# Patient Record
Sex: FEMALE | Race: WHITE | Employment: FULL TIME | URBAN - METROPOLITAN AREA
[De-identification: names, ages, dates, MRNs, and addresses within clinical notes are randomized per-mention and may not be internally consistent; named-entity substitution may affect disease eponyms.]

---

## 2017-09-01 ENCOUNTER — HOSPITAL ENCOUNTER (OUTPATIENT)
Dept: RADIOLOGY | Facility: MEDICAL CENTER | Age: 52
End: 2017-09-01
Attending: FAMILY MEDICINE
Payer: COMMERCIAL

## 2017-09-01 ENCOUNTER — OFFICE VISIT (OUTPATIENT)
Dept: URGENT CARE | Facility: PHYSICIAN GROUP | Age: 52
End: 2017-09-01
Payer: COMMERCIAL

## 2017-09-01 VITALS
OXYGEN SATURATION: 97 % | SYSTOLIC BLOOD PRESSURE: 114 MMHG | HEART RATE: 109 BPM | HEIGHT: 67 IN | TEMPERATURE: 98.1 F | DIASTOLIC BLOOD PRESSURE: 76 MMHG

## 2017-09-01 DIAGNOSIS — H61.21 IMPACTED CERUMEN OF RIGHT EAR: ICD-10-CM

## 2017-09-01 DIAGNOSIS — R07.9 CHEST PAIN, UNSPECIFIED TYPE: ICD-10-CM

## 2017-09-01 PROCEDURE — 99203 OFFICE O/P NEW LOW 30 MIN: CPT | Mod: 25 | Performed by: FAMILY MEDICINE

## 2017-09-01 PROCEDURE — 69210 REMOVE IMPACTED EAR WAX UNI: CPT | Performed by: FAMILY MEDICINE

## 2017-09-01 PROCEDURE — 71020 DX-CHEST-2 VIEWS: CPT

## 2017-09-01 NOTE — PROGRESS NOTES
"Subjective:      Chief Complaint   Patient presents with   • Chest Pain     Sudden onset midline chest pain 2am this morning. No SOB, hurts to take a deep breath.                       Chest Pain   This is a recurrent problem. Episode onset: 2am.  The onset quality is undetermined. The problem occurs intermittently. The problem has been unchanged. The pain is present in the substernal region. The pain is mild. The quality of the pain is described as \"achy\". The pain does not radiate. Associated symptoms include:  None.  Pertinent negatives include no claudication, cough, exertional chest pressure, fever, nausea, near-syncope, numbness, syncope or vomiting. The pain is aggravated by nothing. Patient has tried NSAIDs for the symptoms. The treatment provided no relief. There are no known risk factors. Past medical history comments: none.  Pertinent negatives for family medical history include: no CAD.     Also c/o rt earwax impaction x 1  Wk      Past medical history was unremarkable and not pertinent to current issue  Social hx - denies tobacco, alcohol, drug use    Family hx:  Mother had MI at age 40               Review of Systems   Constitutional: Negative for fever, chills and weight loss.   HENT - denies cough, congestion, sore throat  Eyes: denies vision changes, discharge  Respiratory: Negative for cough and wheezing.    Cardiovascular: Negative for palpitations or PND.   Gastrointestinal:  No abdominal pain,  nausea, vomiting, diarrhea.  Negative for  blood in stool.    - no discharge, dysuria, frequency.      Neurological: Negative for dizziness and headaches.   musculoskeletal - denies myalgias, calf pain  Psych - denies anxiety/depression/mood changes.  Skin: no itching or rash  All other systems reviewed and are negative.       Objective:     There were no vitals taken for this visit.      Physical Exam   Constitutional: pt is oriented to person, place, and time. Pt appears well-developed and " well-nourished.   HENT:   Head: Normocephalic and atraumatic.   Mouth/Throat: Oropharynx is clear and moist.   Eyes: Conjunctivae and EOM are normal. Pupils are equal, round, and reactive to light. No scleral icterus.   Rt ear - cerumen impaction  Neck: Normal range of motion. Neck supple. No JVD present. No thyromegaly present.   Cardiovascular: Normal rate, regular rhythm, normal heart sounds and intact distal pulses.  Exam reveals no gallop and no friction rub.    No murmur heard.  Pulmonary/Chest: Effort normal and breath sounds normal. No respiratory distress. Pt has no wheezes. Pt has no rales. Pt exhibits + tenderness over sternum.   Abdominal: Soft.   Musculoskeletal: Normal range of motion. Pt exhibits no edema.   Lymphadenopathy:     Pt has no cervical adenopathy.   Neurological: pt is alert and oriented to person, place, and time. No cranial nerve deficit.   Skin: Skin is warm and dry. No erythema.   Psychiatric: pt has a normal mood and affect. patient's behavior is normal.     EKG interpretation - normal sinus rhythm. No ST or QRS morphology changes. QT interval is normal. Axis is positive. No signs of ischemia.       Assessment/Plan:        1. Chest pain, unspecified type  EKG Reassuring  Likely musculoskeletal.   Rx naprosyn 250mg tid, prn    Follow up in one week if no improvement, sooner if symptoms worsen.       2.  Cerumen impaction  Rt ear canal was impacted with cerumen. Ear lavage was performed with normal saline, afterward impacted cerumen was removed with speculum. Subsequent to this, tympanic membrane was visualized.

## 2018-06-28 ENCOUNTER — OFFICE VISIT (OUTPATIENT)
Dept: MEDICAL GROUP | Facility: PHYSICIAN GROUP | Age: 53
End: 2018-06-28
Payer: COMMERCIAL

## 2018-06-28 VITALS
DIASTOLIC BLOOD PRESSURE: 66 MMHG | HEIGHT: 67 IN | BODY MASS INDEX: 34.84 KG/M2 | SYSTOLIC BLOOD PRESSURE: 118 MMHG | HEART RATE: 81 BPM | TEMPERATURE: 98.3 F | WEIGHT: 222 LBS | OXYGEN SATURATION: 96 %

## 2018-06-28 DIAGNOSIS — Z12.31 SCREENING MAMMOGRAM, ENCOUNTER FOR: ICD-10-CM

## 2018-06-28 DIAGNOSIS — Z00.00 WELL ADULT ON ROUTINE HEALTH CHECK: ICD-10-CM

## 2018-06-28 DIAGNOSIS — M15.9 PRIMARY OSTEOARTHRITIS INVOLVING MULTIPLE JOINTS: ICD-10-CM

## 2018-06-28 DIAGNOSIS — N95.1 PERIMENOPAUSAL: ICD-10-CM

## 2018-06-28 PROBLEM — M15.0 PRIMARY OSTEOARTHRITIS INVOLVING MULTIPLE JOINTS: Status: ACTIVE | Noted: 2018-06-28

## 2018-06-28 PROCEDURE — 99386 PREV VISIT NEW AGE 40-64: CPT | Mod: 25 | Performed by: FAMILY MEDICINE

## 2018-06-28 ASSESSMENT — PATIENT HEALTH QUESTIONNAIRE - PHQ9: CLINICAL INTERPRETATION OF PHQ2 SCORE: 0

## 2018-06-28 NOTE — PROGRESS NOTES
Елена Julian is a 53 y.o. female here for changes in monthly cycle; joint pain  HPI: This is a pleasant 53-year-old female here today to establish care and presents to the following concerns:  Perimenopausal  This problem is new to me. Patient is reporting over the last few months that she is started to notice significant changes in her monthly cycle. She states that some months she will only spot for a couple of days; she is actually skipped a couple months; this month she actually had spotting for 10 days. She currently denies any mood fluctuations or hot flashes.    Primary osteoarthritis involving multiple joints  This problem is new to me. Patient reports multiple joints which she has pain along with occasional swelling. She states that the pain comes and goes; it is achy in nature; can be worse at the end of the day. She denies any numbness or tingling; denies that this prevents her from doing any of her daily activities.    Current medicines (including changes today)  No current outpatient prescriptions on file.     No current facility-administered medications for this visit.      She  has no past medical history of Diabetes (HCC); Hyperlipidemia; Hypertension; or Thyroid disease.  She  has a past surgical history that includes lumpectomy.  Social History   Substance Use Topics   • Smoking status: Never Smoker   • Smokeless tobacco: Never Used   • Alcohol use Yes      Comment: once a month     Social History     Social History Narrative   • No narrative on file     Family History   Problem Relation Age of Onset   • Heart Disease Mother    • Lung Disease Mother    • No Known Problems Father    • No Known Problems Sister    • Other Brother      MS   • Lung Disease Maternal Grandmother      Family Status   Relation Status   • Mother    • Father     shot   • Sister Alive   • Brother Alive   • Maternal Grandmother          ROS  + Joint pain; no chest pain, no shortness of breath, no abdominal  "pain, no headache  All other systems reviewed and are negative     Objective:     Blood pressure 118/66, pulse 81, temperature 36.8 °C (98.3 °F), height 1.702 m (5' 7\"), weight 100.7 kg (222 lb), SpO2 96 %. Body mass index is 34.77 kg/m².  Physical Exam:    Constitutional: Alert, no distress.  Skin: Warm, dry, good turgor, no rashes in visible areas.  Eye: Equal, round and reactive, conjunctiva clear, lids normal.  ENMT: Lips without lesions, good dentition, oropharynx clear.  Neck: Trachea midline, no masses, no thyromegaly. No cervical or supraclavicular lymphadenopathy.  Respiratory: Unlabored respiratory effort, lungs clear to auscultation, no wheezes, no ronchi.  Cardiovascular: Normal S1, S2, no murmur, no edema.  Abdomen: Soft, non-tender, no masses, no hepatosplenomegaly -obese and difficult to assess.  Psych: Alert and oriented x3, normal affect and mood.  Neuro: Cranial nerves II through XII grossly intact      Assessment and Plan:   The following treatment plan was discussed     1. Well adult on routine health check  COMP METABOLIC PANEL    CBC WITH DIFFERENTIAL    LIPID PROFILE    TSH    VITAMIN D,25 HYDROXY    No significant finding on exam; chart reviewed and updated; age-appropriate screening labs ordered; review at next appointment   2. Perimenopausal      Stable. Monitor   3. Primary osteoarthritis involving multiple joints      Stable. Patient will use over-the-counter Aspercreme or capsaicin cream for pain management; monitor   4. Screening mammogram, encounter for  MA-SCREEN MAMMO W/CAD-BILAT    Screening mammogram ordered; patient denies any breast issues today.        Records requested.  Followup: Return in about 6 weeks (around 8/9/2018) for pap/lab review/mammogram, Long.            "

## 2018-06-28 NOTE — ASSESSMENT & PLAN NOTE
This problem is new to me. Patient is reporting over the last few months that she is started to notice significant changes in her monthly cycle. She states that some months she will only spot for a couple of days; she is actually skipped a couple months; this month she actually had spotting for 10 days. She currently denies any mood fluctuations or hot flashes.

## 2018-06-28 NOTE — ASSESSMENT & PLAN NOTE
This problem is new to me. Patient reports multiple joints which she has pain along with occasional swelling. She states that the pain comes and goes; it is achy in nature; can be worse at the end of the day. She denies any numbness or tingling; denies that this prevents her from doing any of her daily activities.

## 2018-07-24 ENCOUNTER — HOSPITAL ENCOUNTER (OUTPATIENT)
Dept: RADIOLOGY | Facility: MEDICAL CENTER | Age: 53
End: 2018-07-24
Attending: FAMILY MEDICINE
Payer: COMMERCIAL

## 2018-07-24 DIAGNOSIS — Z12.31 SCREENING MAMMOGRAM, ENCOUNTER FOR: ICD-10-CM

## 2018-07-24 PROCEDURE — 77067 SCR MAMMO BI INCL CAD: CPT

## 2018-07-25 ENCOUNTER — TELEPHONE (OUTPATIENT)
Dept: MEDICAL GROUP | Facility: PHYSICIAN GROUP | Age: 53
End: 2018-07-25

## 2018-07-25 DIAGNOSIS — R92.8 ABNORMAL MAMMOGRAM OF RIGHT BREAST: ICD-10-CM

## 2018-07-25 DIAGNOSIS — Z00.00 WELL ADULT ON ROUTINE HEALTH CHECK: ICD-10-CM

## 2018-07-25 NOTE — TELEPHONE ENCOUNTER
----- Message from Denise Michelle D.O. sent at 7/25/2018  8:58 AM PDT -----  Please advise pt that additional images of the right breast will need to be completed.  I have ordered the tests - please give pt phone number to schedule    Denise Michelle D.O.

## 2018-08-01 ENCOUNTER — HOSPITAL ENCOUNTER (OUTPATIENT)
Dept: RADIOLOGY | Facility: MEDICAL CENTER | Age: 53
End: 2018-08-01
Attending: FAMILY MEDICINE
Payer: COMMERCIAL

## 2018-08-01 DIAGNOSIS — R92.8 ABNORMAL MAMMOGRAM OF RIGHT BREAST: ICD-10-CM

## 2018-08-01 PROCEDURE — 76642 ULTRASOUND BREAST LIMITED: CPT | Mod: RT

## 2018-08-01 PROCEDURE — 77065 DX MAMMO INCL CAD UNI: CPT | Mod: RT

## 2018-08-03 ENCOUNTER — HOSPITAL ENCOUNTER (OUTPATIENT)
Facility: MEDICAL CENTER | Age: 53
End: 2018-08-03
Attending: FAMILY MEDICINE
Payer: COMMERCIAL

## 2018-08-03 ENCOUNTER — OFFICE VISIT (OUTPATIENT)
Dept: MEDICAL GROUP | Facility: PHYSICIAN GROUP | Age: 53
End: 2018-08-03
Payer: COMMERCIAL

## 2018-08-03 VITALS
SYSTOLIC BLOOD PRESSURE: 112 MMHG | HEART RATE: 74 BPM | DIASTOLIC BLOOD PRESSURE: 74 MMHG | BODY MASS INDEX: 34.84 KG/M2 | OXYGEN SATURATION: 96 % | HEIGHT: 67 IN | TEMPERATURE: 97.4 F | WEIGHT: 222 LBS

## 2018-08-03 DIAGNOSIS — R79.89 LOW SERUM VITAMIN D: ICD-10-CM

## 2018-08-03 DIAGNOSIS — Z01.419 WELL WOMAN EXAM WITH ROUTINE GYNECOLOGICAL EXAM: ICD-10-CM

## 2018-08-03 DIAGNOSIS — E78.2 MIXED HYPERLIPIDEMIA: ICD-10-CM

## 2018-08-03 PROCEDURE — 88175 CYTOPATH C/V AUTO FLUID REDO: CPT

## 2018-08-03 PROCEDURE — 87624 HPV HI-RISK TYP POOLED RSLT: CPT

## 2018-08-03 PROCEDURE — 99396 PREV VISIT EST AGE 40-64: CPT | Mod: 25 | Performed by: FAMILY MEDICINE

## 2018-08-04 DIAGNOSIS — Z01.419 WELL WOMAN EXAM WITH ROUTINE GYNECOLOGICAL EXAM: ICD-10-CM

## 2018-08-04 NOTE — ASSESSMENT & PLAN NOTE
This problem is new to me.  Recent labs have been reviewed in detail with the patient today which shows that her total cholesterol is elevated to 46 and her triglycerides are elevated at 434.  Risk of elevated cholesterol and triglyceride have been reviewed with the patient.  She states that she recently has started a new eating plan and plans to start getting some daily exercise.  Patient would like to seriously consider just lifestyle changes for 3 months prior to needing to start any medication.

## 2018-08-04 NOTE — PROGRESS NOTES
SUBJECTIVE: 53 y.o. female for annual routine gynecologic exam  Chief Complaint   Patient presents with   • Gynecologic Exam       Obstetric History     No data available      Last Pap: 2015  History   Sexual Activity   • Sexual activity: Yes     Sexual history: currently sexually active, single partner   H/O Abnormal Pap no  Previous HIV testing? no  She  reports that she has never smoked. She has never used smokeless tobacco.        Allergies: Aspirin; Ibuprofen; and Vicodin [hydrocodone-acetaminophen]     ROS:    Reports no menopause symptoms of hot flashes, night sweats, sleep disruption, mood changes.Denies vaginal dryness.   No significant bloating/fluid retention, pelvic pain, or dyspareunia. No vaginal discharge   No breast tenderness, mass, nipple discharge, changes in size or contour, or abnormal cyclic discomfort.  No urinary tract symptoms, no incontinence.   No abdominal pain, change in bowel habits, black or bloody stools.    No unusual headaches, no visual changes, menstrual migraines   No prolonged cough. No dyspnea or chest pain on exertion.  No depression, labile mood, anxiety, libido changes, insomnia.  No polydipsia, polyuria, temperature intolerance.  No new/concerning skin lesions, concerns. No chest pain, no short of breath, no abdominal pain    Exercise: no regular exercise program  Preventive Care:mammogram    Current medicines (including changes today)  No current outpatient prescriptions on file.     No current facility-administered medications for this visit.      She  has a past medical history of Mixed hyperlipidemia (8/3/2018). She also has no past medical history of Diabetes (HCC); Hypertension; or Thyroid disease.  She  has a past surgical history that includes lumpectomy.     Family History:   Family History   Problem Relation Age of Onset   • Heart Disease Mother    • Lung Disease Mother    • No Known Problems Father    • No Known Problems Sister    • Other Brother         MS   •  "Lung Disease Maternal Grandmother           OBJECTIVE:   /74   Pulse 74   Temp 36.3 °C (97.4 °F)   Ht 1.702 m (5' 7\")   Wt 100.7 kg (222 lb)   SpO2 96%   BMI 34.77 kg/m²   Body mass index is 34.77 kg/m².    HEAD AND NECK:  Ears normal.  Throat, oral cavity and tongue normal.  Neck supple. No adenopathy or masses in the neck or supraclavicular regions.  No carotid bruits. No thyromegaly.    CHEST:  Clear, good air entry, no wheezes or rales.   HEART:  Regular rate and rhythm.  S1 and S2 normal.   No edema or JVD. ABDOMEN:  Soft without tenderness, guarding, mass or organomegaly.  No CVA tenderness or inguinal adenopathy.   EXTREMITIES:  Extremities, reflexes and peripheral pulses are normal.   SKIN: color normal, vascularity normal, no edema, temperature normal   No rashes or suspicious skin lesions noted.     Breast Exam: Performed with instruction during examination. No axillary lymphadenopathy, no skin changes, no dominant masses. No nipple retraction    Pelvic Exam -  Normal external genitalia with no lesions. Normal vaginal mucosa with normal rugation and no discharge. Cervix with no visible lesions. No cervical motion tenderness. Uterus is normal sized with no masses. No adnexal tenderness or enlargement appreciated. Thin Prep Pap is obtained, vaginal swab is obtained and specimen(s) sent to lab  Rectal: deferred    <ASSESSMENT and PLAN>  1. Well woman exam with routine gynecological exam  THINPREP PAP WITH HPV    No acute findings on exam; Pap collected; monitor for results   2. Low serum vitamin D      Uncontrolled.  Recommend over-the-counter vitamin D3 1000 international units daily   3. Mixed hyperlipidemia  COMP METABOLIC PANEL    LIPID PROFILE    Uncontrolled; patient will be given a trial of 3 months for lifestyle changes; recheck labs and monitor for results       Discussed  breast self exam, mammography screening, diet and exercise   Follow-up in 2 years for next Gyn exam and 2 years for " next Pap.   Next office visit for recheck of chronic medical conditions is due in 6 months

## 2018-08-04 NOTE — ASSESSMENT & PLAN NOTE
This problem is new to me.  Recent labs have been reviewed in detail with the patient today which shows that her vitamin D level is suboptimal at 39.  She currently does not take any supplemental vitamin D.

## 2018-08-06 LAB
CYTOLOGY REG CYTOL: NORMAL
HPV HR 12 DNA CVX QL NAA+PROBE: NEGATIVE
HPV16 DNA SPEC QL NAA+PROBE: NEGATIVE
HPV18 DNA SPEC QL NAA+PROBE: NEGATIVE
SPECIMEN SOURCE: NORMAL

## 2018-08-07 ENCOUNTER — TELEPHONE (OUTPATIENT)
Dept: MEDICAL GROUP | Facility: PHYSICIAN GROUP | Age: 53
End: 2018-08-07

## 2018-08-07 NOTE — TELEPHONE ENCOUNTER
----- Message from Denise Michelle D.O. sent at 8/7/2018  1:28 PM PDT -----  Please advise pt that the pap smear was normal.  Recommend repeat testing in 2 years.    Denise Michelle D.O.

## 2019-01-03 ENCOUNTER — OFFICE VISIT (OUTPATIENT)
Dept: MEDICAL GROUP | Facility: PHYSICIAN GROUP | Age: 54
End: 2019-01-03
Payer: COMMERCIAL

## 2019-01-03 VITALS
SYSTOLIC BLOOD PRESSURE: 108 MMHG | TEMPERATURE: 97.3 F | HEIGHT: 67 IN | WEIGHT: 212 LBS | OXYGEN SATURATION: 96 % | BODY MASS INDEX: 33.27 KG/M2 | DIASTOLIC BLOOD PRESSURE: 74 MMHG | HEART RATE: 74 BPM

## 2019-01-03 DIAGNOSIS — Z12.12 SCREENING FOR COLORECTAL CANCER: ICD-10-CM

## 2019-01-03 DIAGNOSIS — E66.9 CLASS 1 OBESITY WITHOUT SERIOUS COMORBIDITY WITH BODY MASS INDEX (BMI) OF 33.0 TO 33.9 IN ADULT, UNSPECIFIED OBESITY TYPE: ICD-10-CM

## 2019-01-03 DIAGNOSIS — Z12.11 SCREENING FOR COLORECTAL CANCER: ICD-10-CM

## 2019-01-03 DIAGNOSIS — R79.89 LOW SERUM VITAMIN D: ICD-10-CM

## 2019-01-03 DIAGNOSIS — N95.1 PERIMENOPAUSAL: ICD-10-CM

## 2019-01-03 DIAGNOSIS — E78.2 MIXED HYPERLIPIDEMIA: ICD-10-CM

## 2019-01-03 DIAGNOSIS — M15.9 PRIMARY OSTEOARTHRITIS INVOLVING MULTIPLE JOINTS: ICD-10-CM

## 2019-01-03 PROBLEM — E66.811 CLASS 1 OBESITY WITHOUT SERIOUS COMORBIDITY IN ADULT: Status: ACTIVE | Noted: 2019-01-03

## 2019-01-03 PROCEDURE — 99214 OFFICE O/P EST MOD 30 MIN: CPT | Performed by: NURSE PRACTITIONER

## 2019-01-03 NOTE — ASSESSMENT & PLAN NOTE
This is a chronic problem for the patient that is ongoing.  Patient reports that her back and bilateral hands are mostly affected.  The patient reports that she is having less pain related to osteoarthritis in her back as she is increasing her exercise and losing weight.  She also practices daily meditation which she believes helps with pain.  She has in the past tried CBD oil which was not helpful.  She will use peppermint oil on occasion on her hands which helps decrease pain.  She does not take any over-the-counter medications for this problem.

## 2019-01-03 NOTE — ASSESSMENT & PLAN NOTE
"This is a chronic problem for this patient that is uncontrolled.  The patient's most recent lipid profile was completed in July 2018. The patient's total cholesterol was 246, HDL 32, triglycerides 434.  The patient had an appointment in August 2018 with her prior PCP to discuss these lab results and has made drastic lifestyle changes in order to prevent requiring medication for this issue.  Patient reports that she is cut out sugar and fried foods.  She has increased vegetable intake and is eating more chicken and fish.  The patient joined a gym in December 2018 and is planning on meeting with a  starting 1/7/2019.  The patient has lost 10 pounds since August 2018.  She has also joined an online weight loss program called \"[employer]\" which helps with meal planning, exercise, supplements.  "

## 2019-01-03 NOTE — ASSESSMENT & PLAN NOTE
This is a chronic problem for the patient that is controlled.  The patient's last vitamin D level was 39 on 7/23/2018.  The patient is not taking any vitamin D supplements.

## 2019-01-03 NOTE — ASSESSMENT & PLAN NOTE
"This is a chronic problem for the patient that is uncontrolled.  The patient reports that she is still having intermittent periods, her last period was from 11/13/2018-11/19/2018 during which she went through 2 boxes of super plus tampons.  She was due to have her next period on December 12, 2018 but has not started as of today.  She reports that she does have occasional cramping and did have one day of a \"pink sludge discharge\".  She denies any menopausal symptoms including mood fluctuations or flashes.  "

## 2019-01-04 NOTE — PROGRESS NOTES
"CC:   Chief Complaint   Patient presents with   • Establish Care       HISTORY OF THE PRESENT ILLNESS: Patient is a 53 y.o. female. This pleasant patient is here today to establish care and discuss multiple issues as listed below.    Health Maintenance: Completed, patient declined Tdap vaccination today.  Patient reports that she had a previous allergic reaction to influenza vaccine, allergy added and influenza vaccine excluded from health maintenance.  Patient is eligible for Shingrix, however clinic is out of stock.  Patient is also overdue for colonoscopy, patient would prefer to complete the colon cancer screening annual fit test.    Mixed hyperlipidemia  This is a chronic problem for this patient that is uncontrolled.  The patient's most recent lipid profile was completed in July 2018. The patient's total cholesterol was 246, HDL 32, triglycerides 434.  The patient had an appointment in August 2018 with her prior PCP to discuss these lab results and has made drastic lifestyle changes in order to prevent requiring medication for this issue.  Patient reports that she is cut out sugar and fried foods.  She has increased vegetable intake and is eating more chicken and fish.  The patient joined a gym in December 2018 and is planning on meeting with a  starting 1/7/2019.  The patient has lost 10 pounds since August 2018.  She has also joined an online weight loss program called \"[employer]\" which helps with meal planning, exercise, supplements.    Low serum vitamin D  This is a chronic problem for the patient that is controlled.  The patient's last vitamin D level was 39 on 7/23/2018.  The patient is not taking any vitamin D supplements.    Primary osteoarthritis involving multiple joints  This is a chronic problem for the patient that is ongoing.  Patient reports that her back and bilateral hands are mostly affected.  The patient reports that she is having less pain related to osteoarthritis in her " "back as she is increasing her exercise and losing weight.  She also practices daily meditation which she believes helps with pain.  She has in the past tried CBD oil which was not helpful.  She will use peppermint oil on occasion on her hands which helps decrease pain.  She does not take any over-the-counter medications for this problem.    Perimenopausal  This is a chronic problem for the patient that is uncontrolled.  The patient reports that she is still having intermittent periods, her last period was from 11/13/2018-11/19/2018 during which she went through 2 boxes of super plus tampons.  She was due to have her next period on December 12, 2018 but has not started as of today.  She reports that she does have occasional cramping and did have one day of a \"pink sludge discharge\".  She denies any menopausal symptoms including mood fluctuations or flashes.    Class 1 obesity without serious comorbidity in adult  This is a chronic problem for the patient that is improving.  The patient's current weight is 212 pounds with a BMI of 33.2.  On 8/3/2018 the patient's weight was 222 pounds with BMI of 34.77.  In October 2018 the patient joined a weight loss/health program called \"[employer]\".  This prescription provides meal planning recommendations, exercise recommendations, supplements to be used before exercise, and protein meal replacement shakes that she drinks in the morning.  The patient has also significantly cut down on her sugar and fried food intake since August 2018 due to her elevated triglycerides.  The patient joined a gym in December 2018 and is planning to start seeing a  on 1/7/2019.  The patient also reports that she is excited to try to attend the annual conference in [place] for this program.  She also reports that she has lost 5 inches in her thighs and 3-1/2 inches in her abdomen.    Allergies: Aspirin; Ibuprofen; Influenza vaccines; and Vicodin [hydrocodone-acetaminophen]    No " current Epic-ordered outpatient prescriptions on file.     No current [facility]-ordered facility-administered medications on file.        Past Medical History:   Diagnosis Date   • Head ache     post    • Mixed hyperlipidemia 8/3/2018       Past Surgical History:   Procedure Laterality Date   • PRIMARY C SECTION  1988   • LUMPECTOMY Bilateral        Social History   Substance Use Topics   • Smoking status: Never Smoker   • Smokeless tobacco: Never Used   • Alcohol use Yes      Comment: once a month       Social History     Social History Narrative   • No narrative on file       Family History   Problem Relation Age of Onset   • Heart Disease Mother    • Lung Disease Mother    • No Known Problems Father    • Other Sister         Gum disease   • Heart Disease Sister    • Other Brother         MS   • Heart Attack Brother    • Lung Disease Brother    • Cancer Brother         mouth cancer r/t chewing tobacco   • Alcohol/Drug Brother         Yovanny ETOH   • Lung Disease Maternal Grandmother    • Kidney cancer Maternal Grandmother    • Lung Cancer Maternal Grandmother    • No Known Problems Paternal Grandmother    • No Known Problems Sister        ROS:   Constitutional:  Negative for fever, chills, unexpected weight change, night sweats, body aches, sleep issues,  and fatigue/generalized weakness.   HEENT:  Negative for headaches, vision changes, hearing changes, ear pain, tinnitus, ear discharge, rhinorrhea, sinus congestion, sneezing, sore throat, and neck pain.    Respiratory:  Negative for cough, shortness of breath, sputum production, hemoptysis, chest congestion, dyspnea, wheezing, and crackles.    Cardiovascular:  Negative for chest pain, palpitations, RAE, paroxsymal nocturnal dyspnea, orthopnea, and bilateral lower extremity edema.   Gastrointestinal:  Negative for heartburn, nausea, vomiting, abdominal pain, hematochezia, melena, diarrhea, constipation, and greasy/foul-smelling stools.   Genitourinary:   "Negative for dysuria, nocturia, polyuria, hematuria, pyuria, urinary urgency, urinary frequency, and urinary incontinence.   Musculoskeletal:  Negative for myalgias, back pain, and joint pain.   Skin:  Negative for rash, sores, lumps, itching, cyanotic skin color change.   Neurological:  Negative for dizziness, tingling, tremors, focal sensory deficit, focal weakness and headaches.   Endo/Heme/Allergies:  Does not bruise/bleed easily. Denies cold/heat intolerance.   Psychiatric/Behavioral: Negative for depression, suicidal/homicidal ideation and memory loss.        Exam: Blood pressure 108/74, pulse 74, temperature 36.3 °C (97.3 °F), temperature source Temporal, height 1.702 m (5' 7\"), weight 96.2 kg (212 lb), SpO2 96 %. Body mass index is 33.2 kg/m².    General:  Normal appearing. No distress.  HEENT:  Normocephalic. Eyes conjunctiva clear lids without ptosis, pupils equal and reactive to light accommodation, ears normal shape and contour, canals with small amount of cerumen bilaterally, tympanic membranes are benign, nasal mucosa benign, oropharynx is without erythema, edema or exudates.   Neck:  Supple without JVD or bruit. Thyroid is not enlarged.  Pulmonary:  Clear to ausculation.  Normal effort. No rales, ronchi, or wheezing.  Cardiovascular:  Regular rate and rhythm without murmur. Carotid and radial pulses are intact and equal bilaterally.  Abdomen:  Soft, nontender, nondistended. Normal bowel sounds. Liver and spleen are not palpable.  Neurologic:  Grossly nonfocal.  Lymph:  No cervical, supraclavicular or axillary lymph nodes are palpable.  Skin:  Warm and dry.  No obvious lesions.  Musculoskeletal:  Normal gait. No extremity cyanosis, clubbing, or edema.  Psych:  Normal mood and affect. Alert and oriented x3. Judgment and insight is normal.    Assessment/Plan  1. Mixed hyperlipidemia  This is a chronic problem for the patient that is ongoing.  The patient's last lipid profile was completed in July 2018.  " The patient's triglycerides were elevated at 434.  The patient has drastically changed her diet as well as increasing her activity levels in an attempt at avoiding medication for this issue.  We will plan to check a CMP and a lipid profile and follow-up based on results.  - COMP METABOLIC PANEL; Future  - Lipid Profile; Future    2. Class 1 obesity without serious comorbidity with body mass index (BMI) of 33.0 to 33.9 in adult, unspecified obesity type  This is a chronic problem for the patient that is improving.  The patient has lost 10 pounds since August 2018.  She has joined a gym and is going to be starting seeing a .  She is joined a health program online that offer support for healthy diet, exercise, and weight loss.  Continue with current plan.    3. Low serum vitamin D  This is a chronic problem for the patient that is controlled.  The patient is not taking any over-the-counter supplements for this issue.  Patient states that she spends a lot of times outdoors.    4. Primary osteoarthritis involving multiple joints  This is a chronic problem for the patient that is ongoing.  Patient states that her back and bilateral hands are affected.  The patient reports that her pain symptoms are decreasing with her increase in exercise and weight loss, and meditation.  She will also use over-the-counter peppermint oil to help with pain relief.  Continue current plan.    5. Perimenopausal  This is a chronic problem for the patient that is uncontrolled.  Patient continues to have intermittent periods and spotting.  Patient denies any menopausal symptoms such as mood fluctuations or hot flashes.    6. Screening for colorectal cancer  Order and fit kit provided.  - OCCULT BLOOD FECES IMMUNOASSAY (FIT); Future    Educated in proper administration of medication(s) ordered today including safety, possible SE, risks, benefits, rationale and alternatives to therapy.   Supportive care, differential diagnoses, and  indications for immediate follow-up discussed with patient.    Pathogenesis of diagnosis discussed including typical length and natural progression.    Instructed to return to clinic or nearest emergency department for any change in condition, further concerns, or worsening of symptoms.  Patient states understanding of the plan of care and discharge instructions.    Return in about 2 months (around 3/3/2019) for Follow up Labs, High Cholesterol.    Please note that this dictation was created using voice recognition software. I have made every reasonable attempt to correct obvious errors, but I expect that there are errors of grammar and possibly content that I did not discover before finalizing the note.

## 2019-01-04 NOTE — ASSESSMENT & PLAN NOTE
"This is a chronic problem for the patient that is improving.  The patient's current weight is 212 pounds with a BMI of 33.2.  On 8/3/2018 the patient's weight was 222 pounds with BMI of 34.77.  In October 2018 the patient joined a weight loss/health program called \"[facility]\".  This prescription provides meal planning recommendations, exercise recommendations, supplements to be used before exercise, and protein meal replacement shakes that she drinks in the morning.  The patient has also significantly cut down on her sugar and fried food intake since August 2018 due to her elevated triglycerides.  The patient joined a gym in December 2018 and is planning to start seeing a  on 1/7/2019.  The patient also reports that she is excited to try to attend the annual conference in [place] for this program.  She also reports that she has lost 5 inches in her thighs and 3-1/2 inches in her abdomen.  "

## 2019-01-20 ENCOUNTER — HOSPITAL ENCOUNTER (OUTPATIENT)
Facility: MEDICAL CENTER | Age: 54
End: 2019-01-20
Attending: NURSE PRACTITIONER
Payer: COMMERCIAL

## 2019-01-20 PROCEDURE — 82274 ASSAY TEST FOR BLOOD FECAL: CPT

## 2019-01-23 DIAGNOSIS — Z12.12 SCREENING FOR COLORECTAL CANCER: ICD-10-CM

## 2019-01-23 DIAGNOSIS — Z12.11 SCREENING FOR COLORECTAL CANCER: ICD-10-CM

## 2019-01-26 LAB — HEMOCCULT STL QL IA: NEGATIVE

## 2019-11-06 ENCOUNTER — OFFICE VISIT (OUTPATIENT)
Dept: MEDICAL GROUP | Facility: PHYSICIAN GROUP | Age: 54
End: 2019-11-06
Payer: COMMERCIAL

## 2019-11-06 ENCOUNTER — HOSPITAL ENCOUNTER (OUTPATIENT)
Dept: RADIOLOGY | Facility: MEDICAL CENTER | Age: 54
End: 2019-11-06
Attending: NURSE PRACTITIONER
Payer: COMMERCIAL

## 2019-11-06 VITALS
HEART RATE: 67 BPM | WEIGHT: 222 LBS | BODY MASS INDEX: 34.84 KG/M2 | OXYGEN SATURATION: 96 % | TEMPERATURE: 96.9 F | DIASTOLIC BLOOD PRESSURE: 80 MMHG | HEIGHT: 67 IN | SYSTOLIC BLOOD PRESSURE: 118 MMHG

## 2019-11-06 DIAGNOSIS — R06.2 WHEEZING: ICD-10-CM

## 2019-11-06 DIAGNOSIS — R20.0 NUMBNESS OF FINGERS: ICD-10-CM

## 2019-11-06 DIAGNOSIS — E66.9 CLASS 1 OBESITY WITHOUT SERIOUS COMORBIDITY WITH BODY MASS INDEX (BMI) OF 34.0 TO 34.9 IN ADULT, UNSPECIFIED OBESITY TYPE: ICD-10-CM

## 2019-11-06 PROCEDURE — 71046 X-RAY EXAM CHEST 2 VIEWS: CPT

## 2019-11-06 PROCEDURE — 99214 OFFICE O/P EST MOD 30 MIN: CPT | Performed by: NURSE PRACTITIONER

## 2019-11-06 ASSESSMENT — PATIENT HEALTH QUESTIONNAIRE - PHQ9: CLINICAL INTERPRETATION OF PHQ2 SCORE: 0

## 2019-11-06 NOTE — ASSESSMENT & PLAN NOTE
"This is a new problem to the examiner.  New problem for the patient that has been going on for a couple of months.  Patient reports that she is experiencing numbness in her right hand, specifically thumb, pointer finger, middle finger.  Patient reports she also has a \"knot\" on her middle finger that has gotten larger.  Patient denies any neck injuries, does have a history of a back injury.  Patient reports that when she woke up this morning her right hand was stuck in a claw shape and when she forced her finger straight she had \"zinging pains up her wrist\".  The patient does have a history of surgery on her left wrist cyst removals.  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  Reports that the numbness in her fingers is always there.  "

## 2019-11-06 NOTE — PROGRESS NOTES
"CC:   Chief Complaint   Patient presents with   • Numbness     3 fingers on right hand, shoots pain into wrist x 2 months   • Wheezing     at night x 1 month     HISTORY OF THE PRESENT ILLNESS: Patient is a 54 y.o. female. This pleasant patient is here today to discuss right hand numbness and nighttime wheezing.    Health Maintenance: Completed    Wheezing  This is a new problem to the examiner.  New problem for the patient that began approximately 1 month ago.  Patient states that she has been waking up in the middle of the night wheezing.  States that she does not have any other symptoms including cough, congestion, shortness of breath.  Patient also reports that she does not have wheezing during the day.  States that breathing in cold air does not make her wheezing worse, denies having seasonal or environmental allergies, denies heartburn.  The patient does have a history of pneumonia in the past and states that current symptoms do not feel like when she was sick in the past.  Denies fevers, chills, headaches.  The patient is a never smoker.    Numbness of fingers  This is a new problem to the examiner.  New problem for the patient that has been going on for a couple of months.  Patient reports that she is experiencing numbness in her right hand, specifically thumb, pointer finger, middle finger.  Patient reports she also has a \"knot\" on her middle finger that has gotten larger.  Patient denies any neck injuries, does have a history of a back injury.  Patient reports that when she woke up this morning her right hand was stuck in a claw shape and when she forced her finger straight she had \"zinging pains up her wrist\".  The patient does have a history of surgery on her left wrist cyst removals.  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  Reports that the numbness in her fingers is always there.    Class 1 obesity without serious comorbidity in " "adult  Chronic problem for the patient that is ongoing.  Patient continues to participate in weight loss/health program called \"[employer]\".  The patient has recently decreased her frequency at the gym and has become less strict on her diet, which has resulted in a 10 pound weight gain since she was seen in 2019.  Vitals 8/3/2018 1/3/2019 2019   WEIGHT 222 212 222   HEIGHT 5' 7\" 5' 7\" 5' 7\"   BMI 34.77 kg/m2 33.2 kg/m2 34.77 kg/m2     Allergies: Aspirin; Ibuprofen; Influenza vaccines; and Vicodin [hydrocodone-acetaminophen]    No current Epic-ordered outpatient medications on file.     No current Epic-ordered facility-administered medications on file.      Past Medical History:   Diagnosis Date   • Head ache     post    • Mixed hyperlipidemia 8/3/2018     Past Surgical History:   Procedure Laterality Date   • PRIMARY C SECTION  1988   • LUMPECTOMY Bilateral      Social History     Tobacco Use   • Smoking status: Never Smoker   • Smokeless tobacco: Never Used   Substance Use Topics   • Alcohol use: Yes     Comment: once a month   • Drug use: No     Social History     Patient does not qualify to have social determinant information on file (likely too young).   Social History Narrative   • Not on file     Family History   Problem Relation Age of Onset   • Heart Disease Mother    • Lung Disease Mother    • No Known Problems Father    • Other Sister         Gum disease   • Heart Disease Sister    • Other Brother         MS   • Heart Attack Brother    • Lung Disease Brother    • Cancer Brother         mouth cancer r/t chewing tobacco   • Alcohol/Drug Brother         Yovanny ETOH   • Lung Disease Maternal Grandmother    • Kidney cancer Maternal Grandmother    • Lung Cancer Maternal Grandmother    • No Known Problems Paternal Grandmother    • No Known Problems Sister      ROS:   Constitutional: No Fevers, Chills  Eyes: No eye pain  ENT: No sore throat  Resp: + Nighttime wheezing.  No Shortness of " "breath  CV: No Chest pain  GI: No Nausea/Vomiting  MSK: No weakness  Skin: No rashes  Neuro: + Right hand thumb, pointer finger, middle finger numbness.  No Headaches  Psych: No Suicidal ideations    All remaining systems reviewed and found to be negative, except as stated above.      Exam: /80 (BP Location: Left arm, Patient Position: Sitting, BP Cuff Size: Adult)   Pulse 67   Temp 36.1 °C (96.9 °F) (Temporal)   Ht 1.702 m (5' 7\")   Wt 100.7 kg (222 lb)   SpO2 96%  Body mass index is 34.77 kg/m².    General:  Normal appearing. No distress.  HEENT:  Normocephalic. Eyes conjunctiva clear lids without ptosis, pupils equal and reactive to light accommodation, ears normal shape and contour, canals are clear bilaterally, tympanic membranes are benign, nasal mucosa benign, oropharynx is without erythema, edema or exudates.  Pulmonary:  Clear to ausculation.  Normal effort. No rales, ronchi, or wheezing.  Cardiovascular:  Regular rate and rhythm without murmur. Carotid and radial pulses are intact and equal bilaterally.  Neurologic:  Grossly nonfocal.  Lymph:  No cervical, supraclavicular lymph nodes are palpable.  Skin:  Warm and dry.  No obvious lesions.  Musculoskeletal:  Normal gait. No extremity cyanosis, clubbing, or edema.  Psych:  Normal mood and affect. Alert and oriented x3. Judgment and insight is normal.    Assessment/Plan  1. Wheezing  - DX-CHEST-2 VIEWS; Future    2. Numbness of fingers  - REFERRAL TO NEURODIAGNOSTICS (EEG,EP,EMG/NCS/DBS) Modality Requested: EMG-Comment Extremities (Right upper extremity)    3. Class 1 obesity without serious comorbidity with body mass index (BMI) of 34.0 to 34.9 in adult, unspecified obesity type  Continue working on healthy diet, exercise, weight loss.  Continue working with [employer] program.    Educated in proper administration of medication(s) ordered today including safety, possible SE, risks, benefits, rationale and alternatives to therapy.   Supportive " care, differential diagnoses, and indications for immediate follow-up discussed with patient.    Pathogenesis of diagnosis discussed including typical length and natural progression.    Instructed to return to clinic or nearest emergency department for any change in condition, further concerns, or worsening of symptoms.  Patient states understanding of the plan of care and discharge instructions.    Return if symptoms worsen or fail to improve, for Follow up Diagnostics.    Please note that this dictation was created using voice recognition software. I have made every reasonable attempt to correct obvious errors, but I expect that there are errors of grammar and possibly content that I did not discover before finalizing the note.

## 2019-11-06 NOTE — ASSESSMENT & PLAN NOTE
"Chronic problem for the patient that is ongoing.  Patient continues to participate in weight loss/health program called \"[facility]\".  The patient has recently decreased her frequency at the gym and has become less strict on her diet, which has resulted in a 10 pound weight gain since she was seen in January 2019.  Vitals 8/3/2018 1/3/2019 11/6/2019   WEIGHT 222 212 222   HEIGHT 5' 7\" 5' 7\" 5' 7\"   BMI 34.77 kg/m2 33.2 kg/m2 34.77 kg/m2     "

## 2019-11-06 NOTE — ASSESSMENT & PLAN NOTE
This is a new problem to the examiner.  New problem for the patient that began approximately 1 month ago.  Patient states that she has been waking up in the middle of the night wheezing.  States that she does not have any other symptoms including cough, congestion, shortness of breath.  Patient also reports that she does not have wheezing during the day.  States that breathing in cold air does not make her wheezing worse, denies having seasonal or environmental allergies, denies heartburn.  The patient does have a history of pneumonia in the past and states that current symptoms do not feel like when she was sick in the past.  Denies fevers, chills, headaches.  The patient is a never smoker.

## 2020-01-03 ENCOUNTER — NON-PROVIDER VISIT (OUTPATIENT)
Dept: NEUROLOGY | Facility: MEDICAL CENTER | Age: 55
End: 2020-01-03
Payer: COMMERCIAL

## 2020-01-03 DIAGNOSIS — R20.0 NUMBNESS OF FINGERS: ICD-10-CM

## 2020-01-03 PROCEDURE — 95886 MUSC TEST DONE W/N TEST COMP: CPT | Performed by: PSYCHIATRY & NEUROLOGY

## 2020-01-03 PROCEDURE — 95908 NRV CNDJ TST 3-4 STUDIES: CPT | Performed by: PSYCHIATRY & NEUROLOGY

## 2020-01-07 DIAGNOSIS — R20.0 NUMBNESS OF FINGERS: ICD-10-CM

## 2020-01-20 ENCOUNTER — HOSPITAL ENCOUNTER (OUTPATIENT)
Dept: RADIOLOGY | Facility: MEDICAL CENTER | Age: 55
End: 2020-01-20
Attending: NURSE PRACTITIONER
Payer: COMMERCIAL

## 2020-01-20 DIAGNOSIS — R20.0 NUMBNESS OF FINGERS: ICD-10-CM

## 2020-01-20 PROCEDURE — 76882 US LMTD JT/FCL EVL NVASC XTR: CPT | Mod: RT

## 2020-01-22 DIAGNOSIS — R20.0 NUMBNESS OF FINGERS: ICD-10-CM

## 2020-01-22 NOTE — PROGRESS NOTES
1. Numbness of fingers  - REFERRAL TO ORTHOPEDICS    EMG and ultrasound completed without specific cause for patients pain and numbness. Referral to ortho.

## 2020-02-06 SDOH — HEALTH STABILITY: MENTAL HEALTH: HOW OFTEN DO YOU HAVE 6 OR MORE DRINKS ON ONE OCCASION?: MONTHLY

## 2020-02-06 SDOH — HEALTH STABILITY: MENTAL HEALTH: HOW MANY STANDARD DRINKS CONTAINING ALCOHOL DO YOU HAVE ON A TYPICAL DAY?: 1 OR 2

## 2020-02-10 ENCOUNTER — ANESTHESIA EVENT (OUTPATIENT)
Dept: SURGERY | Facility: MEDICAL CENTER | Age: 55
End: 2020-02-10
Payer: COMMERCIAL

## 2020-02-10 ENCOUNTER — HOSPITAL ENCOUNTER (OUTPATIENT)
Facility: MEDICAL CENTER | Age: 55
End: 2020-02-10
Attending: ORTHOPAEDIC SURGERY | Admitting: ORTHOPAEDIC SURGERY
Payer: COMMERCIAL

## 2020-02-10 ENCOUNTER — ANESTHESIA (OUTPATIENT)
Dept: SURGERY | Facility: MEDICAL CENTER | Age: 55
End: 2020-02-10
Payer: COMMERCIAL

## 2020-02-10 VITALS
HEIGHT: 67 IN | BODY MASS INDEX: 34.46 KG/M2 | WEIGHT: 219.58 LBS | HEART RATE: 71 BPM | RESPIRATION RATE: 16 BRPM | DIASTOLIC BLOOD PRESSURE: 63 MMHG | OXYGEN SATURATION: 96 % | TEMPERATURE: 97.7 F | SYSTOLIC BLOOD PRESSURE: 116 MMHG

## 2020-02-10 PROCEDURE — 700111 HCHG RX REV CODE 636 W/ 250 OVERRIDE (IP): Performed by: ORTHOPAEDIC SURGERY

## 2020-02-10 PROCEDURE — A9270 NON-COVERED ITEM OR SERVICE: HCPCS | Performed by: ANESTHESIOLOGY

## 2020-02-10 PROCEDURE — 501838 HCHG SUTURE GENERAL: Performed by: ORTHOPAEDIC SURGERY

## 2020-02-10 PROCEDURE — 160025 RECOVERY II MINUTES (STATS): Performed by: ORTHOPAEDIC SURGERY

## 2020-02-10 PROCEDURE — 700111 HCHG RX REV CODE 636 W/ 250 OVERRIDE (IP): Performed by: ANESTHESIOLOGY

## 2020-02-10 PROCEDURE — 500881 HCHG PACK, EXTREMITY: Performed by: ORTHOPAEDIC SURGERY

## 2020-02-10 PROCEDURE — 160028 HCHG SURGERY MINUTES - 1ST 30 MINS LEVEL 3: Performed by: ORTHOPAEDIC SURGERY

## 2020-02-10 PROCEDURE — 700105 HCHG RX REV CODE 258: Performed by: ORTHOPAEDIC SURGERY

## 2020-02-10 PROCEDURE — 160046 HCHG PACU - 1ST 60 MINS PHASE II: Performed by: ORTHOPAEDIC SURGERY

## 2020-02-10 PROCEDURE — 700102 HCHG RX REV CODE 250 W/ 637 OVERRIDE(OP): Performed by: ANESTHESIOLOGY

## 2020-02-10 PROCEDURE — 700101 HCHG RX REV CODE 250: Performed by: ORTHOPAEDIC SURGERY

## 2020-02-10 PROCEDURE — 160048 HCHG OR STATISTICAL LEVEL 1-5: Performed by: ORTHOPAEDIC SURGERY

## 2020-02-10 PROCEDURE — 160009 HCHG ANES TIME/MIN: Performed by: ORTHOPAEDIC SURGERY

## 2020-02-10 PROCEDURE — A6222 GAUZE <=16 IN NO W/SAL W/O B: HCPCS | Performed by: ORTHOPAEDIC SURGERY

## 2020-02-10 PROCEDURE — 500423 HCHG DRESSING, ABD COMBINE: Performed by: ORTHOPAEDIC SURGERY

## 2020-02-10 PROCEDURE — 160047 HCHG PACU  - EA ADDL 30 MINS PHASE II: Performed by: ORTHOPAEDIC SURGERY

## 2020-02-10 PROCEDURE — 302135 SEQUENTIAL COMPRESSION MACHINE: Performed by: ORTHOPAEDIC SURGERY

## 2020-02-10 RX ORDER — LIDOCAINE HYDROCHLORIDE 10 MG/ML
INJECTION, SOLUTION INFILTRATION; PERINEURAL
Status: DISCONTINUED | OUTPATIENT
Start: 2020-02-10 | End: 2020-02-10 | Stop reason: HOSPADM

## 2020-02-10 RX ORDER — MIDAZOLAM HYDROCHLORIDE 1 MG/ML
INJECTION INTRAMUSCULAR; INTRAVENOUS PRN
Status: DISCONTINUED | OUTPATIENT
Start: 2020-02-10 | End: 2020-02-10 | Stop reason: SURG

## 2020-02-10 RX ORDER — ACETAMINOPHEN 500 MG
1000 TABLET ORAL ONCE
Status: COMPLETED | OUTPATIENT
Start: 2020-02-10 | End: 2020-02-10

## 2020-02-10 RX ORDER — BUPIVACAINE HYDROCHLORIDE 2.5 MG/ML
INJECTION, SOLUTION EPIDURAL; INFILTRATION; INTRACAUDAL
Status: DISCONTINUED | OUTPATIENT
Start: 2020-02-10 | End: 2020-02-10 | Stop reason: HOSPADM

## 2020-02-10 RX ORDER — LORAZEPAM 2 MG/ML
0.5 INJECTION INTRAMUSCULAR
Status: DISCONTINUED | OUTPATIENT
Start: 2020-02-10 | End: 2020-02-10 | Stop reason: HOSPADM

## 2020-02-10 RX ORDER — HYDROMORPHONE HYDROCHLORIDE 1 MG/ML
0.2 INJECTION, SOLUTION INTRAMUSCULAR; INTRAVENOUS; SUBCUTANEOUS
Status: DISCONTINUED | OUTPATIENT
Start: 2020-02-10 | End: 2020-02-10 | Stop reason: HOSPADM

## 2020-02-10 RX ORDER — HYDRALAZINE HYDROCHLORIDE 20 MG/ML
5 INJECTION INTRAMUSCULAR; INTRAVENOUS
Status: DISCONTINUED | OUTPATIENT
Start: 2020-02-10 | End: 2020-02-10 | Stop reason: HOSPADM

## 2020-02-10 RX ORDER — OXYCODONE HCL 5 MG/5 ML
5 SOLUTION, ORAL ORAL
Status: DISCONTINUED | OUTPATIENT
Start: 2020-02-10 | End: 2020-02-10 | Stop reason: HOSPADM

## 2020-02-10 RX ORDER — MEPERIDINE HYDROCHLORIDE 25 MG/ML
12.5 INJECTION INTRAMUSCULAR; INTRAVENOUS; SUBCUTANEOUS
Status: DISCONTINUED | OUTPATIENT
Start: 2020-02-10 | End: 2020-02-10 | Stop reason: HOSPADM

## 2020-02-10 RX ORDER — LABETALOL HYDROCHLORIDE 5 MG/ML
5 INJECTION, SOLUTION INTRAVENOUS
Status: DISCONTINUED | OUTPATIENT
Start: 2020-02-10 | End: 2020-02-10 | Stop reason: HOSPADM

## 2020-02-10 RX ORDER — DIPHENHYDRAMINE HYDROCHLORIDE 50 MG/ML
12.5 INJECTION INTRAMUSCULAR; INTRAVENOUS
Status: DISCONTINUED | OUTPATIENT
Start: 2020-02-10 | End: 2020-02-10 | Stop reason: HOSPADM

## 2020-02-10 RX ORDER — ONDANSETRON 2 MG/ML
4 INJECTION INTRAMUSCULAR; INTRAVENOUS
Status: DISCONTINUED | OUTPATIENT
Start: 2020-02-10 | End: 2020-02-10 | Stop reason: HOSPADM

## 2020-02-10 RX ORDER — HYDROMORPHONE HYDROCHLORIDE 1 MG/ML
0.4 INJECTION, SOLUTION INTRAMUSCULAR; INTRAVENOUS; SUBCUTANEOUS
Status: DISCONTINUED | OUTPATIENT
Start: 2020-02-10 | End: 2020-02-10 | Stop reason: HOSPADM

## 2020-02-10 RX ORDER — HALOPERIDOL 5 MG/ML
1 INJECTION INTRAMUSCULAR
Status: DISCONTINUED | OUTPATIENT
Start: 2020-02-10 | End: 2020-02-10 | Stop reason: HOSPADM

## 2020-02-10 RX ORDER — ONDANSETRON 2 MG/ML
INJECTION INTRAMUSCULAR; INTRAVENOUS PRN
Status: DISCONTINUED | OUTPATIENT
Start: 2020-02-10 | End: 2020-02-10 | Stop reason: SURG

## 2020-02-10 RX ORDER — HYDROMORPHONE HYDROCHLORIDE 1 MG/ML
0.1 INJECTION, SOLUTION INTRAMUSCULAR; INTRAVENOUS; SUBCUTANEOUS
Status: DISCONTINUED | OUTPATIENT
Start: 2020-02-10 | End: 2020-02-10 | Stop reason: HOSPADM

## 2020-02-10 RX ORDER — SODIUM CHLORIDE, SODIUM LACTATE, POTASSIUM CHLORIDE, CALCIUM CHLORIDE 600; 310; 30; 20 MG/100ML; MG/100ML; MG/100ML; MG/100ML
INJECTION, SOLUTION INTRAVENOUS CONTINUOUS
Status: DISCONTINUED | OUTPATIENT
Start: 2020-02-10 | End: 2020-02-10 | Stop reason: HOSPADM

## 2020-02-10 RX ORDER — OXYCODONE HCL 5 MG/5 ML
10 SOLUTION, ORAL ORAL
Status: DISCONTINUED | OUTPATIENT
Start: 2020-02-10 | End: 2020-02-10 | Stop reason: HOSPADM

## 2020-02-10 RX ADMIN — ONDANSETRON 4 MG: 2 INJECTION INTRAMUSCULAR; INTRAVENOUS at 10:05

## 2020-02-10 RX ADMIN — PROPOFOL 50 MG: 10 INJECTION, EMULSION INTRAVENOUS at 10:00

## 2020-02-10 RX ADMIN — PROPOFOL 50 MG: 10 INJECTION, EMULSION INTRAVENOUS at 09:56

## 2020-02-10 RX ADMIN — ACETAMINOPHEN 1000 MG: 500 TABLET, FILM COATED ORAL at 08:17

## 2020-02-10 RX ADMIN — PROPOFOL 50 MG: 10 INJECTION, EMULSION INTRAVENOUS at 10:04

## 2020-02-10 RX ADMIN — LIDOCAINE HYDROCHLORIDE 0.5 ML: 10 INJECTION, SOLUTION INFILTRATION; PERINEURAL at 08:16

## 2020-02-10 RX ADMIN — MIDAZOLAM HYDROCHLORIDE 2 MG: 1 INJECTION, SOLUTION INTRAMUSCULAR; INTRAVENOUS at 09:51

## 2020-02-10 RX ADMIN — SODIUM CHLORIDE, POTASSIUM CHLORIDE, SODIUM LACTATE AND CALCIUM CHLORIDE: 600; 310; 30; 20 INJECTION, SOLUTION INTRAVENOUS at 08:16

## 2020-02-10 RX ADMIN — PROPOFOL 50 MG: 10 INJECTION, EMULSION INTRAVENOUS at 09:55

## 2020-02-10 NOTE — OR NURSING
1015: pt arrived to phase II straight from OR. AOx4. No signs of resp distress. Denies pain and nausea. Gauze wrap to right hand clean, dry, intact. S/p local anesthesia. Right hand numb at this time. Pt able to wiggle fingers. <2 second cap refill and warm skin to right hand. Right hand elevated on pillow with ice pack in place.     1030: tolerating PO. VSS.     1102: Dr. Steinberg called regarding d/c orders. Telephone order taken for discharge. Please see orders.   1105: pt and son gien d/c orders regarding post anesthesia, post surgery, NWB restriction while local anesthesia in place, dressing care, signs of infection, and signs of emergency. Pt and son verbalized understanding and asked relevant questions.    1120: Pt continues to deny pain or nausea. Tolerating PO. IV discontinued without complication. Pt given extra Ace wrap upon request. Surgical dressing remains clean, dry, intact.     1124: Pt wheeled to son's car.

## 2020-02-10 NOTE — ANESTHESIA PREPROCEDURE EVALUATION
Relevant Problems   No relevant active problems       Physical Exam    Airway   Mallampati: I  TM distance: >3 FB  Neck ROM: full       Cardiovascular - normal exam     Dental    Pulmonary   Breath sounds clear to auscultation     Abdominal   (+) obese     Neurological - normal exam                 Anesthesia Plan    ASA 2       Plan - MAC             Induction: intravenous      Pertinent diagnostic labs and testing reviewed    Informed Consent:    Anesthetic plan and risks discussed with patient.

## 2020-02-10 NOTE — DISCHARGE INSTRUCTIONS
ACTIVITY: Rest and take it easy for the first 24 hours.  A responsible adult is recommended to remain with you during that time.  It is normal to feel sleepy.  We encourage you to not do anything that requires balance, judgment or coordination.    MILD FLU-LIKE SYMPTOMS ARE NORMAL. YOU MAY EXPERIENCE GENERALIZED MUSCLE ACHES, THROAT IRRITATION, HEADACHE AND/OR SOME NAUSEA.    FOR 24 HOURS DO NOT:  Drive, operate machinery or run household appliances.  Drink beer or alcoholic beverages.   Make important decisions or sign legal documents.    SPECIAL INSTRUCTIONS: No lifting anything heavier than 5 pounds until follow-up. Keep right hand non-weight bearing until local anesthesia wears off.     DIET: To avoid nausea, slowly advance diet as tolerated, avoiding spicy or greasy foods for the first day.  Add more substantial food to your diet according to your physician's instructions.  Babies can be fed formula or breast milk as soon as they are hungry.  INCREASE FLUIDS AND FIBER TO AVOID CONSTIPATION.    SURGICAL DRESSING/BATHING: Dressing stays in place for 5 days. Ok to shower POD#1 with wound covered.     FOLLOW-UP APPOINTMENT:  A follow-up appointment should be arranged with your doctor. Call to schedule.    You should CALL YOUR PHYSICIAN if you develop:  Fever greater than 101 degrees F.  Pain not relieved by medication, or persistent nausea or vomiting.  Excessive bleeding (blood soaking through dressing) or unexpected drainage from the wound.  Extreme redness or swelling around the incision site, drainage of pus or foul smelling drainage.  Inability to urinate or empty your bladder within 8 hours.  Problems with breathing or chest pain.    You should call 911 if you develop problems with breathing or chest pain.  If you are unable to contact your doctor or surgical center, you should go to the nearest emergency room or urgent care center.  Physician's telephone #: [phone]    If any questions arise, call your  doctor.  If your doctor is not available, please feel free to call the Surgical Center at [phone].  The Center is open Monday through Friday from 7AM to 7PM.  You can also call the HEALTH HOTLINE open 24 hours/day, 7 days/week and speak to a nurse at [phone], or toll free at [phone].    A registered nurse may call you a few days after your surgery to see how you are doing after your procedure.    MEDICATIONS: Resume taking daily medication.  Take prescribed pain medication with food.  If no medication is prescribed, you may take non-aspirin pain medication if needed.  PAIN MEDICATION CAN BE VERY CONSTIPATING.  Take a stool softener or laxative such as senokot, pericolace, or milk of magnesia if needed.    Prescription given for N/A.  Last pain medication given at N/A.    If your physician has prescribed pain medication that includes Acetaminophen (Tylenol), do not take additional Acetaminophen (Tylenol) while taking the prescribed medication.    Depression / Suicide Risk    As you are discharged from this [facility] facility, it is important to learn how to keep safe from harming yourself.    Recognize the warning signs:  · Abrupt changes in personality, positive or negative- including increase in energy   · Giving away possessions  · Change in eating patterns- significant weight changes-  positive or negative  · Change in sleeping patterns- unable to sleep or sleeping all the time   · Unwillingness or inability to communicate  · Depression  · Unusual sadness, discouragement and loneliness  · Talk of wanting to die  · Neglect of personal appearance   · Rebelliousness- reckless behavior  · Withdrawal from people/activities they love  · Confusion- inability to concentrate     If you or a loved one observes any of these behaviors or has concerns about self-harm, here's what you can do:  · Talk about it- your feelings and reasons for harming yourself  · Remove any means that you might use to hurt  yourself (examples: pills, rope, extension cords, firearm)  · Get professional help from the community (Mental Health, Substance Abuse, psychological counseling)  · Do not be alone:Call your Safe Contact- someone whom you trust who will be there for you.  · Call your local CRISIS HOTLINE 359-1057 or 044-976-2425  · Call your local Children's Mobile Crisis Response Team [place] (715) 633-8719 or www.[employer]  · Call the toll free National Suicide Prevention Hotlines   · National Suicide Prevention Lifeline 222-542-DLUZ (0868)  · National Hope Line Network 800-SUICIDE (293-3352)

## 2020-02-10 NOTE — OP REPORT
Orthopedic Surgery Operative Report    Pre-operative Diagnosis:  Right carpal tunnel syndrome    Post-operative Diagnosis:  Same as above    Procedures Performed:  Open carpal tunnel release    Anesthesia:  Monitored anesthesia care     Anesthesiologist:  Sinan     Surgeon:  Georgina Steinberg M.D.     Assistants:  None    EBL:  minimal    Complications:  none    Indication/Procedure:    The patient was met in the preoperative holding area. The operative site was marked. The patient was brought back to the OR by the anesthesia team. The field was injected with marcaine and lidocaine.      The operative extremity was prepped and draped in the standard fashion. A multidisciplinary time out was performed and the correct patient, site and procedure were confirmed.      We began by making a 2.5cm incision in line with the ring finger. The skin was incised. We spread through palmar fat. Palmar fascia was incised with a deep knife. We exposed the transverse carpal ligament. This was incised with a 15 blade. We spread distally until fat was visible and the ligament was fully release. Proximally we cleared a path above and below the proximal aspect of the ligament. We freed the remaining ligament until the leaflets were free and the nerve no longer compressed. This was confirmed with a freer.      The wound was irrigated with saline. Skin was closed with 4-0 nylon. A soft dressing was placed     The patient was awoken from anesthesia and taken to recovery in stable condition.      Post Operative Plan:  Keep dressing on for 5 days  After 5 days, remove dressing and shower normally  OTC pain medications for post-op pain relief  Return to clinic in 2 weeks for suture removal     Georgina Steinberg M.D.

## 2020-02-11 ASSESSMENT — PAIN SCALES - GENERAL: PAIN_LEVEL: 0

## 2020-02-11 NOTE — ANESTHESIA TIME REPORT
Anesthesia Start and Stop Event Times     Date Time Event    2/10/2020 0949 Ready for Procedure     0951 Anesthesia Start     1018 Anesthesia Stop        Responsible Staff  02/10/20    Name Role Begin End    Anushka Menon M.D. Anesth 0951 1018        Preop Diagnosis (Free Text):  Pre-op Diagnosis     CARPAL TUNNEL SYNDROME        Preop Diagnosis (Codes):    Post op Diagnosis  Right carpal tunnel syndrome      Premium Reason  Non-Premium    Comments:

## 2020-02-11 NOTE — ANESTHESIA QCDR
2019 [facility] Clinical Data Registry (for Quality Improvement)     Postoperative nausea/vomiting risk protocol (Adult = 18 yrs and Pediatric 3-17 yrs)- (430 and 463)  General inhalation anesthetic (NOT TIVA) with PONV risk factors: No  Provision of anti-emetic therapy with at least 2 different classes of agents: N/A  Patient DID NOT receive anti-emetic therapy and reason is documented in Medical Record: N/A    Multimodal Pain Management- (477)  Non-emergent surgery AND patient age >= 18: Yes  Use of Multimodal Pain Management, two or more drugs and/or interventions, NOT including systemic opioids: Yes  Exception: Documented allergy to multiple classes of analgesics: N/A    Smoking Abstinence (404)  Patient is current smoker (cigarette, pipe, e-cig, marijuanna): No  Elective Surgery:   Abstinence instructions provided prior to day of surgery:   Patient abstained from smoking on day of surgery:     Pre-Op Beta-Blocker in Isolated CABG (44)  Isolated CABG AND patient age >= 18: No  Beta-blocker admin within 24 hours of surgical incision:   Exception:of medical reason(s) for not administering beta blocker within 24 hours prior to surgical incision (e.g., not  indicated,other medical reason):     PACU assessment of acute postoperative pain prior to Anesthesia Care End- Applies to Patients Age = 18- (ABG7)  Initial PACU pain score is which of the following: < 7/10  Patient unable to report pain score: N/A    Post-anesthetic transfer of care checklist/protocol to PACU/ICU- (426 and 427)  Upon conclusion of case, patient transferred to which of the following locations: PACU/Non-ICU  Use of transfer checklist/protocol: Yes  Exclusion: Service Performed in Patient Hospital Room (and thus did not require transfer): N/A  Unplanned admission to ICU related to anesthesia service up through end of PACU care- (MD51)  Unplanned admission to ICU (not initially anticipated at anesthesia start time): No

## 2020-02-11 NOTE — ANESTHESIA POSTPROCEDURE EVALUATION
Patient: Елена Julian    Procedure Summary     Date:  02/10/20 Room / Location:   OR 06 / SURGERY [facility]    Anesthesia Start:  0951 Anesthesia Stop:  1018    Procedure:  RELEASE, CARPAL TUNNEL (Right Hand) Diagnosis:  (CARPAL TUNNEL SYNDROME)    Surgeon:  Georgina Steinberg M.D. Responsible Provider:  Anushka Menon M.D.    Anesthesia Type:  MAC ASA Status:  2          Final Anesthesia Type: MAC  Last vitals  BP   Blood Pressure: 116/63    Temp   36.5 °C (97.7 °F)    Pulse   Pulse: 71   Resp   16    SpO2   96 %      Anesthesia Post Evaluation    Patient location during evaluation: PACU  Patient participation: complete - patient participated  Level of consciousness: awake and alert  Pain score: 0    Airway patency: patent  Anesthetic complications: no  Cardiovascular status: adequate  Respiratory status: acceptable  Hydration status: acceptable    PONV: none           Nurse Pain Score: 0 (NPRS)

## 2020-06-22 ENCOUNTER — TELEPHONE (OUTPATIENT)
Dept: MEDICAL GROUP | Facility: PHYSICIAN GROUP | Age: 55
End: 2020-06-22

## 2020-06-22 DIAGNOSIS — Z00.00 ROUTINE HEALTH MAINTENANCE: ICD-10-CM

## 2020-06-22 NOTE — TELEPHONE ENCOUNTER
1. Caller Name: Елена Julian                          Call Back Number: [phone] (home)         How would the patient prefer to be contacted with a response: Phone call do NOT leave a detailed message    Referral to Gynecology

## 2020-07-31 ENCOUNTER — GYNECOLOGY VISIT (OUTPATIENT)
Dept: OBGYN | Facility: CLINIC | Age: 55
End: 2020-07-31
Payer: COMMERCIAL

## 2020-07-31 ENCOUNTER — HOSPITAL ENCOUNTER (OUTPATIENT)
Facility: MEDICAL CENTER | Age: 55
End: 2020-07-31
Attending: OBSTETRICS & GYNECOLOGY
Payer: COMMERCIAL

## 2020-07-31 VITALS
WEIGHT: 223.2 LBS | SYSTOLIC BLOOD PRESSURE: 141 MMHG | BODY MASS INDEX: 35.03 KG/M2 | DIASTOLIC BLOOD PRESSURE: 79 MMHG | HEIGHT: 67 IN

## 2020-07-31 DIAGNOSIS — E66.09 CLASS 1 OBESITY DUE TO EXCESS CALORIES WITHOUT SERIOUS COMORBIDITY WITH BODY MASS INDEX (BMI) OF 34.0 TO 34.9 IN ADULT: ICD-10-CM

## 2020-07-31 DIAGNOSIS — Z01.411 ENCNTR FOR GYN EXAM (GENERAL) (ROUTINE) W ABNORMAL FINDINGS: ICD-10-CM

## 2020-07-31 DIAGNOSIS — N89.8 VAGINAL DISCHARGE: ICD-10-CM

## 2020-07-31 DIAGNOSIS — L30.9 VULVAR DERMATITIS: ICD-10-CM

## 2020-07-31 DIAGNOSIS — Z12.31 BREAST CANCER SCREENING BY MAMMOGRAM: ICD-10-CM

## 2020-07-31 DIAGNOSIS — N95.1 PERIMENOPAUSAL: ICD-10-CM

## 2020-07-31 LAB
CANDIDA DNA VAG QL PROBE+SIG AMP: NEGATIVE
G VAGINALIS DNA VAG QL PROBE+SIG AMP: NEGATIVE
T VAGINALIS DNA VAG QL PROBE+SIG AMP: POSITIVE

## 2020-07-31 PROCEDURE — 87510 GARDNER VAG DNA DIR PROBE: CPT

## 2020-07-31 PROCEDURE — 87660 TRICHOMONAS VAGIN DIR PROBE: CPT

## 2020-07-31 PROCEDURE — 87480 CANDIDA DNA DIR PROBE: CPT

## 2020-07-31 PROCEDURE — 99386 PREV VISIT NEW AGE 40-64: CPT | Performed by: OBSTETRICS & GYNECOLOGY

## 2020-07-31 RX ORDER — TRIAMCINOLONE ACETONIDE 1 MG/G
OINTMENT TOPICAL
Qty: 1 G | Refills: 1 | Status: SHIPPED | OUTPATIENT
Start: 2020-07-31 | End: 2021-09-27

## 2020-07-31 SDOH — HEALTH STABILITY: MENTAL HEALTH: HOW OFTEN DO YOU HAVE A DRINK CONTAINING ALCOHOL?: NOT ASKED

## 2020-07-31 NOTE — PROGRESS NOTES
"Subjective:      Елена Julian is a 55 y.o. female who presents with Gynecologic Exam (New Pt)            HPI robert 3Ent is a 55-year-old G1, P1 who presents today for annual gynecologic exam as a new patient and to establish gynecologic care.  She is doing well currentlt or any complaints.  Patient states she has not had any vaginal bleeding/menstrual bleeding for about 8 months and that she had a new partner times during sexual activity, he inserted his finger into the vaginal canal and afterwards there was some bleeding.  She has not had any bleeding since.  She has had intercourse multiple times since that time and has not had any bleeding or pain issues.    Her last Pap smear was in 2018 and was negative.  She  Reports negative Pap smear history and no history of PID or STDs    Her last mammogram was 2018.  Patient does self breast exams    Patient reports normal bowel and bladder functions      Patient states she used to weigh 320 pounds but is now down to 223 with dietary changes and cutting out carbs.  She states she is exercising more frequently    ROS all organ systems are reviewed and were negative except for complaints in HPI       Objective:     /79   Ht 1.702 m (5' 7\")   Wt 101.2 kg (223 lb 3.2 oz)   BMI 34.96 kg/m²      Physical Exam  Vitals signs and nursing note reviewed. Exam conducted with a chaperone present.   Constitutional:       General: She is not in acute distress.     Appearance: Normal appearance. She is obese. She is not toxic-appearing.   HENT:      Head: Normocephalic and atraumatic.      Nose: Nose normal. No congestion or rhinorrhea.   Eyes:      General:         Right eye: No discharge.         Left eye: No discharge.      Conjunctiva/sclera: Conjunctivae normal.   Neck:      Musculoskeletal: Normal range of motion and neck supple. No neck rigidity.   Cardiovascular:      Rate and Rhythm: Normal rate and regular rhythm.      Pulses: Normal pulses.      Heart sounds: " Normal heart sounds. No murmur.   Pulmonary:      Effort: Pulmonary effort is normal. No respiratory distress.      Breath sounds: Normal breath sounds. No wheezing.   Chest:      Breasts:         Right: No swelling, bleeding, inverted nipple, mass, nipple discharge, skin change or tenderness.         Left: No swelling, bleeding, inverted nipple, mass, nipple discharge, skin change or tenderness.   Abdominal:      General: Abdomen is flat. Bowel sounds are normal. There is no distension.      Palpations: Abdomen is soft.      Tenderness: There is no abdominal tenderness.   Genitourinary:     General: Normal vulva.      Labia:         Right: Tenderness present. No rash, lesion or injury.         Left: No rash, tenderness, lesion or injury.       Vagina: Vaginal discharge present. No tenderness, bleeding or lesions.      Cervix: No cervical motion tenderness, friability or erythema.      Uterus: Not enlarged and not tender.       Adnexa:         Right: No mass, tenderness or fullness.          Left: No mass, tenderness or fullness.        Rectum: No external hemorrhoid.          Comments: Moderate amount of yellowish discharge present in the vaginal vault.  Cervix was without lesions.  Bimanual exam was limited by habitus.  Musculoskeletal: Normal range of motion.      Right lower leg: No edema.      Left lower leg: No edema.   Lymphadenopathy:      Cervical: No cervical adenopathy.      Upper Body:      Right upper body: No supraclavicular or axillary adenopathy.      Left upper body: No supraclavicular or axillary adenopathy.      Lower Body: No right inguinal adenopathy. No left inguinal adenopathy.   Skin:     General: Skin is warm and dry.      Findings: No lesion or rash.   Neurological:      General: No focal deficit present.      Mental Status: She is alert and oriented to person, place, and time.      Gait: Gait normal.   Psychiatric:         Mood and Affect: Mood normal.         Behavior: Behavior normal.          Thought Content: Thought content normal.         Judgment: Judgment normal.                 Assessment/Plan:       1. Encntr for gyn exam (general) (routine) w abnormal findings  55-year-old presenting for annual gynecologic exam.  Annual exam is recommended.  Diet exercise and weight management discussed  Safe sex practices reviewed    2. Perimenopausal  Patient is perimenopausal transition without any symptoms except for infrequent menstruation.  Perimenopausal changes discussed.  We discussed menopause.  If patient has abnormal bleeding episode/postcoital bleeding-I recommend evaluation and patient agrees    3. Class 1 obesity due to excess calories without serious comorbidity with body mass index (BMI) of 34.0 to 34.9 in adult  Obesity discussed.  Diet exercise and weight management reviewed    4. Breast cancer screening by mammogram  Breast cancer screening recommendations discussed including monthly self breast exams, annual clinical breast exams and annual mammograms.  Mammogram ordered  - MA-SCREENING MAMMO BILAT W/TOMOSYNTHESIS W/CAD; Future    5.  No vaginal discharge present.  Vaginal pathogen swab obtained    6.  Area of vulvar irritation noted on the right.  Findings were discussed.  We will try a course of triamcinolone twice daily for 7 to 10 days.  If area does not improve, I recommend biopsy and patient agrees    Precautions and plan of care were reviewed

## 2020-07-31 NOTE — NON-PROVIDER
Pt here as New Pt for Gyn exam  Good Phone #:[phone]  Pharmacy verified.  Last Pap:8/4/18, neg result in Labs  LMP:7/4/20  Pt states would like to establish care.  Pt states would like to have well woman exam.  Pt states she did not have period for 8 mos but had started again on 7/4/20 after having intercourse on 7/1/20.

## 2020-08-04 ENCOUNTER — TELEPHONE (OUTPATIENT)
Dept: OBGYN | Facility: CLINIC | Age: 55
End: 2020-08-04

## 2020-08-04 DIAGNOSIS — A59.01 TRICHOMONAS VAGINITIS: ICD-10-CM

## 2020-08-04 RX ORDER — METRONIDAZOLE 500 MG/1
2000 TABLET ORAL ONCE
Qty: 4 TAB | Refills: 0 | Status: SHIPPED | OUTPATIENT
Start: 2020-08-04 | End: 2020-08-04

## 2020-08-04 NOTE — TELEPHONE ENCOUNTER
----- Message from Waylon Blas M.D. sent at 8/4/2020 11:31 AM PDT -----  Please inform patient that vaginal pathogen shows trichomonas which is an STD.  Prescription for Flagyl 2 g for 1 dose was sent to pharmacy.  Her partner will need to be treated also.    8/4/20 1311 Spoke with pt and informed her of above, Rx sent and instructions given. Pt understood and had no questions or concerns.

## 2021-05-17 ENCOUNTER — TELEPHONE (OUTPATIENT)
Dept: MEDICAL GROUP | Facility: PHYSICIAN GROUP | Age: 56
End: 2021-05-17

## 2021-05-17 NOTE — TELEPHONE ENCOUNTER
Phone Number Called: [phone] (home)     I spoke to the patient in regards to her message on not getting the COVID vaccine due to her multiple medication and vaccination reactions.  I sent the patient a copyof the ingredients in all 3 of the COVID vaccinees.    Natalya, the patient is concerned that her employer may require the COVID vaccine for employees.  She is requesting a note stating that she was advised not to get the COVID vaccinations.  (She relayed an incident last night of going out to dinner with family and being refused admission due to not having a vaccination card.  This is a second situation for which she is requesting the note.)

## 2021-05-17 NOTE — TELEPHONE ENCOUNTER
----- Message from Елена Julian sent at 5/15/2021  3:28 PM PDT -----  Regarding: Non-Urgent Medical Question  Contact: [phone]  Question.  If the doctors say because of my allergic reaction to most medications.  The covid shot would not be a good idea so how do I not get denied everywhere to not doing shot. I mean I had bad reaction to flu shot years ago. So I'd probably be in trouble with a covid shot.

## 2021-05-18 ENCOUNTER — TELEPHONE (OUTPATIENT)
Dept: MEDICAL GROUP | Facility: PHYSICIAN GROUP | Age: 56
End: 2021-05-18

## 2021-05-18 NOTE — TELEPHONE ENCOUNTER
Phone Number Called: [phone] (home)     I called and left a message for the patient on her voicemail telling her that at this time,, [facility] will not be providing medical notes for patients who cannot/choose not to get the COVID vaccinations for any reason.  I left my phone number, should the patient have further questions.

## 2021-05-18 NOTE — TELEPHONE ENCOUNTER
Phone Number Called: [phone] (home)     I called the patient to tell her that [facility] will not be providing notes regarding a patient's inability/decision not to take the COVID vaccines.  I reached the patient's voicemail and left a message that I would call her in the morning.  If I do not reach her then, I will communicate this to her via [employer].

## 2021-09-27 ENCOUNTER — OFFICE VISIT (OUTPATIENT)
Dept: MEDICAL GROUP | Facility: PHYSICIAN GROUP | Age: 56
End: 2021-09-27
Payer: COMMERCIAL

## 2021-09-27 VITALS
DIASTOLIC BLOOD PRESSURE: 86 MMHG | HEIGHT: 67 IN | SYSTOLIC BLOOD PRESSURE: 118 MMHG | WEIGHT: 220 LBS | OXYGEN SATURATION: 95 % | TEMPERATURE: 98.5 F | HEART RATE: 89 BPM | BODY MASS INDEX: 34.53 KG/M2

## 2021-09-27 DIAGNOSIS — M15.9 PRIMARY OSTEOARTHRITIS INVOLVING MULTIPLE JOINTS: ICD-10-CM

## 2021-09-27 DIAGNOSIS — Z12.31 ENCOUNTER FOR SCREENING MAMMOGRAM FOR BREAST CANCER: ICD-10-CM

## 2021-09-27 DIAGNOSIS — Z00.00 ROUTINE HEALTH MAINTENANCE: ICD-10-CM

## 2021-09-27 DIAGNOSIS — L65.9 HAIR LOSS: ICD-10-CM

## 2021-09-27 DIAGNOSIS — E55.9 VITAMIN D DEFICIENCY: ICD-10-CM

## 2021-09-27 DIAGNOSIS — Z78.0 POSTMENOPAUSAL: ICD-10-CM

## 2021-09-27 DIAGNOSIS — E78.2 MIXED HYPERLIPIDEMIA: ICD-10-CM

## 2021-09-27 PROBLEM — E66.09 CLASS 1 OBESITY DUE TO EXCESS CALORIES WITH SERIOUS COMORBIDITY AND BODY MASS INDEX (BMI) OF 34.0 TO 34.9 IN ADULT: Status: ACTIVE | Noted: 2019-01-03

## 2021-09-27 PROCEDURE — 99214 OFFICE O/P EST MOD 30 MIN: CPT | Performed by: NURSE PRACTITIONER

## 2021-09-27 ASSESSMENT — PATIENT HEALTH QUESTIONNAIRE - PHQ9: CLINICAL INTERPRETATION OF PHQ2 SCORE: 0

## 2021-09-28 NOTE — PROGRESS NOTES
CC:   Chief Complaint   Patient presents with   • Joint Pain     concerns   • Hair Loss     x 6 months     HISTORY OF THE PRESENT ILLNESS: Patient is a 56 y.o. female. This pleasant patient is here today to discuss issues listed below.    Health Maintenance: Reviewed    Primary osteoarthritis involving multiple joints  Chronic, ongoing. Continues to have a lot of pain in her hands. Had carpal tunnel surgery in her right hand. Had improved pain for a couple of months. Was told that she had tendonitis in bilateral wrists, was given splints to wear that do not help.  Reports that she continues to have back and hip pain, she tries to continue leg exercises but it exacerbates the pain.  She got a new mattress to see if that was contributing to her pain and it did not improve.  Notes that her pain has been progressively worsening over the last 6 months.  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  She has not tried any over-the-counter medications due to allergies.  States that she did try a single aspirin and 2 hours later she developed fever blisters, tongue swelling, and a headache.  In the past exercise and losing weight had been helpful, but she is struggling with weight gain currently.  CBD oil in the past was not helpful, peppermint oil in the past was helpful but no longer.    Postmenopausal  Patient reports that she has had weight gain, hair loss, fatigue.  She was able to lose 40 pounds, but then gained 50 pounds back within 6-7 months.  She has not had a menstrual cycle since before July 2020.  She did have an episode with bleeding after sex in which she also contracted an STI while using a condom.  She did follow with gynecology, but not currently.  Requesting annual labs.    Mixed hyperlipidemia  Chronic, ongoing without medication.  Due for updated lipid  profile.    Hair loss  New to examiner.  Ongoing problem for the patient.  Due for updated labs.    Vitamin D deficiency  Chronic, ongoing without supplementation.  Due for updated labs.    Allergies: Codeine, Aspirin, Ibuprofen, Influenza vaccines, Latex, and Vicodin [hydrocodone-acetaminophen]  No current Epic-ordered outpatient medications on file.     No current [facility]-ordered facility-administered medications on file.     Past Medical History:   Diagnosis Date   • Arthritis    • Delayed emergence from general anesthesia    • Head ache     post    • Left hand tendonitis 2020   • Mixed hyperlipidemia 8/3/2018   • Right hand pain 2019     Past Surgical History:   Procedure Laterality Date   • PB REVISE MEDIAN N/CARPAL TUNNEL SURG Right 2/10/2020    Procedure: RELEASE, CARPAL TUNNEL;  Surgeon: Georgina Steinberg M.D.;  Location: SURGERY [facility];  Service: Orthopedics   • LIPOMA EXCISION Left     wrist   • PRIMARY C SECTION  1988   • LUMPECTOMY Bilateral      Social History     Tobacco Use   • Smoking status: Never Smoker   • Smokeless tobacco: Never Used   Vaping Use   • Vaping Use: Never used   Substance Use Topics   • Alcohol use: Yes     Comment: once a month   • Drug use: No     Social History     Social History Narrative   • Not on file     Family History   Problem Relation Age of Onset   • Heart Disease Mother    • Lung Disease Mother    • No Known Problems Father    • Other Sister         Gum disease   • Heart Disease Sister    • Other Brother         MS   • Heart Attack Brother    • Lung Disease Brother    • Cancer Brother         mouth cancer r/t chewing tobacco   • Alcohol/Drug Brother         Yovanny ETOH   • Lung Disease Maternal Grandmother    • Kidney cancer Maternal Grandmother    • Lung Cancer Maternal Grandmother    • No Known Problems Paternal Grandmother    • No Known Problems Sister      ROS:   Constitutional: + weight gain. No fevers, chills,  "malaise/fatigue.  Eyes: No eye pain.  ENT: No sore throat, congestion.   Resp: No cough, shortness of breath.  CV: No chest pain, leg swelling, palpitations.  GI: No nausea/vomiting, abdominal pain, constipation, diarrhea.  : No dysuria, hematuria.  MSK: + polyarthralgia. No weakness.  Skin: + hair loss. No rashes.  Neuro: No dizziness, weakness, headaches.  Psych: No suicidal ideations.    All remaining systems reviewed and found to be negative, except as stated above.        Exam: /86 (BP Location: Left arm, Patient Position: Sitting, BP Cuff Size: Adult)   Pulse 89   Temp 36.9 °C (98.5 °F) (Temporal)   Ht 1.702 m (5' 7\")   Wt 99.8 kg (220 lb)   SpO2 95%  Body mass index is 34.46 kg/m².    General: Well nourished, well developed female in NAD, awake and conversant.  Eyes: Normal conjunctiva, anicteric.  Round symmetrical pupils.  ENT: Hearing grossly intact.  No nasal discharge.  Neck: Neck is supple.  No masses or thyromegaly.  CV: No lower extremity edema.  Respiratory: Respirations are nonlabored.  No wheezing.  Abdomen: Non-Distended.  Skin: Warm.  No rashes or ulcers.  MSK: Normal ambulation.  No clubbing or cyanosis.  Neuro: Sensation and CN II-XII grossly normal.  Psych: Alert and oriented.  Cooperative, appropriate mood and affect, normal judgment.     Assessment/Plan:  1. Primary osteoarthritis involving multiple joints  Chronic, worsening.  Recommend that the patient try over-the-counter acetaminophen/Tylenol.  Increase exercise beyond regular activity at work.    2. Mixed hyperlipidemia  Chronic, ongoing without medication.  Due for updated labs.  - Comp Metabolic Panel; Future  - Lipid Profile; Future    3. Hair loss  New problem to examiner, ongoing problem for the patient.  Plan to complete labs prior to follow-up.  - CBC WITH DIFFERENTIAL; Future  - Comp Metabolic Panel; Future  - TSH WITH REFLEX TO FT4; Future  - VITAMIN D,25 HYDROXY; Future  - VITAMIN B12; Future  - PROLACTIN; " Future  - FSH/LH; Future  - ESTROGEN TOTAL; Future    4. Postmenopausal  Plan to complete labs prior to follow-up.  - PROLACTIN; Future  - FSH/LH; Future  - ESTROGEN TOTAL; Future    5. Vitamin D deficiency  Chronic, ongoing without supplementation.  Due for updated annual labs.  - VITAMIN D,25 HYDROXY; Future    6. Routine health maintenance  - MA-SCREENING MAMMO BILAT W/CAD; Future  - CBC WITH DIFFERENTIAL; Future  - Comp Metabolic Panel; Future  - Lipid Profile; Future  - TSH WITH REFLEX TO FT4; Future  - VITAMIN D,25 HYDROXY; Future  - VITAMIN B12; Future  - PROLACTIN; Future  - FSH/LH; Future  - ESTROGEN TOTAL; Future    7. Encounter for screening mammogram for breast cancer  Due for screening.  - MA-SCREENING MAMMO BILAT W/CAD; Future     Educated in proper administration of medication(s) ordered today including safety, possible SE, risks, benefits, rationale and alternatives to therapy.   Supportive care, differential diagnoses, and indications for immediate follow-up discussed with patient.    Pathogenesis of diagnosis discussed including typical length and natural progression.    Instructed to return to clinic or nearest emergency department for any change in condition, further concerns, or worsening of symptoms.  Patient states understanding of the plan of care and discharge instructions.    Return in about 1 month (around 10/27/2021) for Follow up Labs.    Please note that this dictation was created using voice recognition software. I have made every reasonable attempt to correct obvious errors, but I expect that there are errors of grammar and possibly content that I did not discover before finalizing the note.

## 2021-09-28 NOTE — ASSESSMENT & PLAN NOTE
Chronic, ongoing without medication.  Due for updated lipid profile.  
Chronic, ongoing without supplementation.  Due for updated labs.  
Chronic, ongoing. Continues to have a lot of pain in her hands. Had carpal tunnel surgery in her right hand. Had improved pain for a couple of months. Was told that she had tendonitis in bilateral wrists, was given splints to wear that do not help.  Reports that she continues to have back and hip pain, she tries to continue leg exercises but it exacerbates the pain.  She got a new mattress to see if that was contributing to her pain and it did not improve.  Notes that her pain has been progressively worsening over the last 6 months.  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  She has not tried any over-the-counter medications due to allergies.  States that she did try a single aspirin and 2 hours later she developed fever blisters, tongue swelling, and a headache.  In the past exercise and losing weight had been helpful, but she is struggling with weight gain currently.  CBD oil in the past was not helpful, peppermint oil in the past was helpful but no longer.  
New to examiner.  Ongoing problem for the patient.  Due for updated labs.  
Patient reports that she has had weight gain, hair loss, fatigue.  She was able to lose 40 pounds, but then gained 50 pounds back within 6-7 months.  She has not had a menstrual cycle since before July 2020.  She did have an episode with bleeding after sex in which she also contracted an STI while using a condom.  She did follow with gynecology, but not currently.  Requesting annual labs.  
actual

## 2021-10-18 ENCOUNTER — HOSPITAL ENCOUNTER (OUTPATIENT)
Dept: RADIOLOGY | Facility: MEDICAL CENTER | Age: 56
End: 2021-10-18
Attending: NURSE PRACTITIONER
Payer: COMMERCIAL

## 2021-10-18 DIAGNOSIS — Z00.00 ROUTINE HEALTH MAINTENANCE: ICD-10-CM

## 2021-10-18 DIAGNOSIS — Z12.31 ENCOUNTER FOR SCREENING MAMMOGRAM FOR BREAST CANCER: ICD-10-CM

## 2021-10-18 PROCEDURE — 77063 BREAST TOMOSYNTHESIS BI: CPT

## 2021-10-20 ENCOUNTER — HOSPITAL ENCOUNTER (OUTPATIENT)
Dept: LAB | Facility: MEDICAL CENTER | Age: 56
End: 2021-10-20
Attending: NURSE PRACTITIONER
Payer: COMMERCIAL

## 2021-10-20 DIAGNOSIS — Z78.0 POSTMENOPAUSAL: ICD-10-CM

## 2021-10-20 DIAGNOSIS — L65.9 HAIR LOSS: ICD-10-CM

## 2021-10-20 DIAGNOSIS — E55.9 VITAMIN D DEFICIENCY: ICD-10-CM

## 2021-10-20 DIAGNOSIS — E78.2 MIXED HYPERLIPIDEMIA: ICD-10-CM

## 2021-10-20 DIAGNOSIS — Z00.00 ROUTINE HEALTH MAINTENANCE: ICD-10-CM

## 2021-10-20 LAB
25(OH)D3 SERPL-MCNC: 33 NG/ML (ref 30–100)
ALBUMIN SERPL BCP-MCNC: 4.5 G/DL (ref 3.2–4.9)
ALBUMIN/GLOB SERPL: 1.5 G/DL
ALP SERPL-CCNC: 55 U/L (ref 30–99)
ALT SERPL-CCNC: 25 U/L (ref 2–50)
ANION GAP SERPL CALC-SCNC: 10 MMOL/L (ref 7–16)
AST SERPL-CCNC: 19 U/L (ref 12–45)
BASOPHILS # BLD AUTO: 0.9 % (ref 0–1.8)
BASOPHILS # BLD: 0.06 K/UL (ref 0–0.12)
BILIRUB SERPL-MCNC: 0.3 MG/DL (ref 0.1–1.5)
BUN SERPL-MCNC: 14 MG/DL (ref 8–22)
CALCIUM SERPL-MCNC: 9.3 MG/DL (ref 8.5–10.5)
CHLORIDE SERPL-SCNC: 105 MMOL/L (ref 96–112)
CHOLEST SERPL-MCNC: 236 MG/DL (ref 100–199)
CO2 SERPL-SCNC: 24 MMOL/L (ref 20–33)
CREAT SERPL-MCNC: 0.59 MG/DL (ref 0.5–1.4)
EOSINOPHIL # BLD AUTO: 0.28 K/UL (ref 0–0.51)
EOSINOPHIL NFR BLD: 4.4 % (ref 0–6.9)
ERYTHROCYTE [DISTWIDTH] IN BLOOD BY AUTOMATED COUNT: 42.7 FL (ref 35.9–50)
FASTING STATUS PATIENT QL REPORTED: NORMAL
FSH SERPL-ACNC: 44.7 MIU/ML
GLOBULIN SER CALC-MCNC: 3 G/DL (ref 1.9–3.5)
GLUCOSE SERPL-MCNC: 98 MG/DL (ref 65–99)
HCT VFR BLD AUTO: 45.1 % (ref 37–47)
HDLC SERPL-MCNC: 32 MG/DL
HGB BLD-MCNC: 15.2 G/DL (ref 12–16)
IMM GRANULOCYTES # BLD AUTO: 0.02 K/UL (ref 0–0.11)
IMM GRANULOCYTES NFR BLD AUTO: 0.3 % (ref 0–0.9)
LDLC SERPL CALC-MCNC: 148 MG/DL
LH SERPL-ACNC: 41.3 IU/L
LYMPHOCYTES # BLD AUTO: 2.5 K/UL (ref 1–4.8)
LYMPHOCYTES NFR BLD: 39.6 % (ref 22–41)
MCH RBC QN AUTO: 29.9 PG (ref 27–33)
MCHC RBC AUTO-ENTMCNC: 33.7 G/DL (ref 33.6–35)
MCV RBC AUTO: 88.6 FL (ref 81.4–97.8)
MONOCYTES # BLD AUTO: 0.43 K/UL (ref 0–0.85)
MONOCYTES NFR BLD AUTO: 6.8 % (ref 0–13.4)
NEUTROPHILS # BLD AUTO: 3.03 K/UL (ref 2–7.15)
NEUTROPHILS NFR BLD: 48 % (ref 44–72)
NRBC # BLD AUTO: 0 K/UL
NRBC BLD-RTO: 0 /100 WBC
PLATELET # BLD AUTO: 230 K/UL (ref 164–446)
PMV BLD AUTO: 10.6 FL (ref 9–12.9)
POTASSIUM SERPL-SCNC: 4 MMOL/L (ref 3.6–5.5)
PROLACTIN SERPL-MCNC: 5.39 NG/ML (ref 2.8–26)
PROT SERPL-MCNC: 7.5 G/DL (ref 6–8.2)
RBC # BLD AUTO: 5.09 M/UL (ref 4.2–5.4)
SODIUM SERPL-SCNC: 139 MMOL/L (ref 135–145)
TRIGL SERPL-MCNC: 280 MG/DL (ref 0–149)
TSH SERPL DL<=0.005 MIU/L-ACNC: 2.81 UIU/ML (ref 0.38–5.33)
VIT B12 SERPL-MCNC: 333 PG/ML (ref 211–911)
WBC # BLD AUTO: 6.3 K/UL (ref 4.8–10.8)

## 2021-10-20 PROCEDURE — 80053 COMPREHEN METABOLIC PANEL: CPT

## 2021-10-20 PROCEDURE — 84443 ASSAY THYROID STIM HORMONE: CPT

## 2021-10-20 PROCEDURE — 36415 COLL VENOUS BLD VENIPUNCTURE: CPT

## 2021-10-20 PROCEDURE — 82671 ASSAY OF ESTROGENS: CPT

## 2021-10-20 PROCEDURE — 84146 ASSAY OF PROLACTIN: CPT

## 2021-10-20 PROCEDURE — 82607 VITAMIN B-12: CPT

## 2021-10-20 PROCEDURE — 80061 LIPID PANEL: CPT

## 2021-10-20 PROCEDURE — 82306 VITAMIN D 25 HYDROXY: CPT

## 2021-10-20 PROCEDURE — 83002 ASSAY OF GONADOTROPIN (LH): CPT

## 2021-10-20 PROCEDURE — 85025 COMPLETE CBC W/AUTO DIFF WBC: CPT

## 2021-10-20 PROCEDURE — 83001 ASSAY OF GONADOTROPIN (FSH): CPT

## 2021-10-24 LAB
ESTRADIOL SERPL HS-MCNC: 8.8 PG/ML
ESTRONE SERPL-MCNC: 17.7 PG/ML

## 2021-11-02 ENCOUNTER — HOSPITAL ENCOUNTER (OUTPATIENT)
Dept: RADIOLOGY | Facility: MEDICAL CENTER | Age: 56
End: 2021-11-02
Attending: NURSE PRACTITIONER
Payer: COMMERCIAL

## 2021-11-02 ENCOUNTER — OFFICE VISIT (OUTPATIENT)
Dept: MEDICAL GROUP | Facility: PHYSICIAN GROUP | Age: 56
End: 2021-11-02
Payer: COMMERCIAL

## 2021-11-02 VITALS
WEIGHT: 221 LBS | HEIGHT: 67 IN | DIASTOLIC BLOOD PRESSURE: 70 MMHG | BODY MASS INDEX: 34.69 KG/M2 | SYSTOLIC BLOOD PRESSURE: 110 MMHG | HEART RATE: 79 BPM | TEMPERATURE: 97.6 F | OXYGEN SATURATION: 95 %

## 2021-11-02 DIAGNOSIS — M15.9 PRIMARY OSTEOARTHRITIS INVOLVING MULTIPLE JOINTS: ICD-10-CM

## 2021-11-02 DIAGNOSIS — E78.2 MIXED HYPERLIPIDEMIA: ICD-10-CM

## 2021-11-02 PROCEDURE — 73130 X-RAY EXAM OF HAND: CPT | Mod: LT

## 2021-11-02 PROCEDURE — 73130 X-RAY EXAM OF HAND: CPT | Mod: RT

## 2021-11-02 PROCEDURE — 99214 OFFICE O/P EST MOD 30 MIN: CPT | Performed by: NURSE PRACTITIONER

## 2021-11-02 ASSESSMENT — FIBROSIS 4 INDEX: FIB4 SCORE: 0.91

## 2021-11-03 NOTE — PROGRESS NOTES
CC:   Chief Complaint   Patient presents with   • Results     HISTORY OF THE PRESENT ILLNESS: Patient is a 56 y.o. female. This pleasant patient is here today to discuss lab results.    Health Maintenance: Reviewed    Mixed hyperlipidemia  Chronic, improving without medication.  For updated annual labs in 2022.  The 10-year ASCVD risk score (Rebeccaestrella KRAUS Jr., et al., 2013) is: 2.9%     Primary osteoarthritis involving multiple joints  Chronic, ongoing.  Continues to have a lot of generalized pain.  Tried to take the Tylenol arthritis medication but experienced mouth tingling and sores within 6 hours after taking the medication and it did not help with pain relief.  Reports left hand trigger finger has been worse over the last few weeks.    Allergies: Codeine, Aspirin, Ibuprofen, Influenza vaccines, Latex, and Vicodin [hydrocodone-acetaminophen]  No current [facility]-ordered outpatient medications on file.     No current [facility]-ordered facility-administered medications on file.     Past Medical History:   Diagnosis Date   • Arthritis    • Delayed emergence from general anesthesia    • Head ache     post    • Left hand tendonitis 2020   • Mixed hyperlipidemia 8/3/2018   • Right hand pain 2019     Past Surgical History:   Procedure Laterality Date   • PB REVISE MEDIAN N/CARPAL TUNNEL SURG Right 2/10/2020    Procedure: RELEASE, CARPAL TUNNEL;  Surgeon: Georgina Steinberg M.D.;  Location: SURGERY [facility];  Service: Orthopedics   • LIPOMA EXCISION Left     wrist   • PRIMARY C SECTION  1988   • LUMPECTOMY Bilateral      Social History     Tobacco Use   • Smoking status: Never Smoker   • Smokeless tobacco: Never Used   Vaping Use   • Vaping Use: Never used   Substance Use Topics   • Alcohol use: Yes     Comment: once a month   • Drug use: No     Social History     Social History Narrative   • Not on file     Family History   Problem Relation Age of Onset   • Heart Disease Mother    • Lung  "Disease Mother    • No Known Problems Father    • Other Sister         Gum disease   • Heart Disease Sister    • Other Brother         MS   • Heart Attack Brother    • Lung Disease Brother    • Cancer Brother         mouth cancer r/t chewing tobacco   • Alcohol/Drug Brother         Yovanny ETOH   • Lung Disease Maternal Grandmother    • Kidney cancer Maternal Grandmother    • Lung Cancer Maternal Grandmother    • No Known Problems Paternal Grandmother    • No Known Problems Sister      ROS:   Constitutional: + weight gain. No fevers, chills, malaise/fatigue.  Eyes: No eye pain.  ENT: No sore throat, congestion.   Resp: No cough, shortness of breath.  CV: No chest pain, leg swelling, palpitations.  GI: No nausea/vomiting, abdominal pain, constipation, diarrhea.  : No dysuria, hematuria.  MSK: + polyarthralgia. No weakness.  Skin: + hair loss. No rashes.  Neuro: No dizziness, weakness, headaches.  Psych: No suicidal ideations.    All remaining systems reviewed and found to be negative, except as stated above.        Exam: /70 (BP Location: Left arm, Patient Position: Sitting, BP Cuff Size: Adult)   Pulse 79   Temp 36.4 °C (97.6 °F) (Temporal)   Ht 1.702 m (5' 7\")   Wt 100 kg (221 lb)   SpO2 95%  Body mass index is 34.61 kg/m².    General: Well nourished, well developed female in NAD, awake and conversant.  Eyes: Normal conjunctiva, anicteric.  Round symmetrical pupils.  ENT: Hearing grossly intact.  No nasal discharge.  Neck: Neck is supple.  No masses or thyromegaly.  CV: No lower extremity edema.  Respiratory: Respirations are nonlabored.  No wheezing.  Abdomen: Non-Distended.  Skin: Warm.  No rashes or ulcers.  MSK: Normal ambulation.  No clubbing or cyanosis.  Neuro: Sensation and CN II-XII grossly normal.  Psych: Alert and oriented.  Cooperative, appropriate mood and affect, normal judgment.     Assessment/Plan:  1. Primary osteoarthritis involving multiple joints  Chronic, ongoing.  Plan to have " patient complete labs and bilateral hand x-rays.  Hesitant to try new medications due to past reactions.  Depending on lab and x-ray results, may consider trying gabapentin.  - Sed Rate; Future  - CRP QUANTITIVE (NON-CARDIAC); Future  - RHEUMATOID ARTHRITIS FACTOR; Future  - ANTI-NUCLEAR ANTIBODY SERUM; Future  - CCP ANTIBODY; Future  - DX-HAND 3+ LEFT; Future  - DX-HAND 3+ RIGHT; Future    2. Mixed hyperlipidemia  Chronic, improving.   Encouraged diet high in fruits, vegetables, and fiber. And a diet low in salt, refined carbohydrates, cholesterol, saturated fat, and trans fatty acids.    Encouraged a minimum of 30 minutes of moderate intensity aerobic exercise (eg, brisk walking) is recommended on five days each week. Or 30 minutes of vigorous-intensity aerobic exercise (eg, jogging) on three days each week.   Due for updated lab work in October 2022.    Educated in proper administration of medication(s) ordered today including safety, possible SE, risks, benefits, rationale and alternatives to therapy.   Supportive care, differential diagnoses, and indications for immediate follow-up discussed with patient.    Pathogenesis of diagnosis discussed including typical length and natural progression.    Instructed to return to clinic or nearest emergency department for any change in condition, further concerns, or worsening of symptoms.  Patient states understanding of the plan of care and discharge instructions.    Return if symptoms worsen or fail to improve.    Please note that this dictation was created using voice recognition software. I have made every reasonable attempt to correct obvious errors, but I expect that there are errors of grammar and possibly content that I did not discover before finalizing the note.

## 2021-11-03 NOTE — ASSESSMENT & PLAN NOTE
Chronic, ongoing.  Continues to have a lot of generalized pain.  Tried to take the Tylenol arthritis medication but experienced mouth tingling and sores within 6 hours after taking the medication and it did not help with pain relief.  Reports left hand trigger finger has been worse over the last few weeks.

## 2021-11-03 NOTE — ASSESSMENT & PLAN NOTE
Chronic, improving without medication.  For updated annual labs in October 2022.  The 10-year ASCVD risk score ([place] NAYANA Jr., et al., 2013) is: 2.9%

## 2021-11-08 ENCOUNTER — HOSPITAL ENCOUNTER (OUTPATIENT)
Dept: LAB | Facility: MEDICAL CENTER | Age: 56
End: 2021-11-08
Attending: NURSE PRACTITIONER
Payer: COMMERCIAL

## 2021-11-08 DIAGNOSIS — M15.9 PRIMARY OSTEOARTHRITIS INVOLVING MULTIPLE JOINTS: ICD-10-CM

## 2021-11-08 LAB
CRP SERPL HS-MCNC: 0.4 MG/DL (ref 0–0.75)
ERYTHROCYTE [SEDIMENTATION RATE] IN BLOOD BY WESTERGREN METHOD: 7 MM/HOUR (ref 0–25)
RHEUMATOID FACT SER IA-ACNC: 46 IU/ML (ref 0–14)

## 2021-11-08 PROCEDURE — 86200 CCP ANTIBODY: CPT

## 2021-11-08 PROCEDURE — 36415 COLL VENOUS BLD VENIPUNCTURE: CPT

## 2021-11-08 PROCEDURE — 86038 ANTINUCLEAR ANTIBODIES: CPT

## 2021-11-08 PROCEDURE — 85652 RBC SED RATE AUTOMATED: CPT

## 2021-11-08 PROCEDURE — 86431 RHEUMATOID FACTOR QUANT: CPT

## 2021-11-08 PROCEDURE — 86140 C-REACTIVE PROTEIN: CPT

## 2021-11-10 LAB
CCP IGG SERPL-ACNC: 79 UNITS (ref 0–19)
NUCLEAR IGG SER QL IA: NORMAL

## 2021-11-14 DIAGNOSIS — R76.8 RHEUMATOID FACTOR POSITIVE: ICD-10-CM

## 2021-11-14 DIAGNOSIS — R76.8 POSITIVE ANTI-CCP TEST: ICD-10-CM

## 2021-11-14 DIAGNOSIS — M25.50 ARTHRALGIA, UNSPECIFIED JOINT: ICD-10-CM

## 2021-11-15 NOTE — PROGRESS NOTES
1. Rheumatoid factor positive  - Referral to Rheumatology    2. Positive anti-CCP test  - Referral to Rheumatology    3. Arthralgia, unspecified joint  - Referral to Rheumatology

## 2022-04-21 DIAGNOSIS — Z78.0 POSTMENOPAUSAL: ICD-10-CM

## 2022-04-21 DIAGNOSIS — Z00.00 ROUTINE HEALTH MAINTENANCE: ICD-10-CM

## 2022-04-22 NOTE — PROGRESS NOTES
1. Postmenopausal  - Referral to Gynecology    2. Routine health maintenance  - Referral to Gynecology

## 2022-07-19 ENCOUNTER — OFFICE VISIT (OUTPATIENT)
Dept: MEDICAL GROUP | Facility: PHYSICIAN GROUP | Age: 57
End: 2022-07-19
Payer: COMMERCIAL

## 2022-07-19 VITALS
WEIGHT: 218 LBS | HEART RATE: 101 BPM | TEMPERATURE: 97 F | HEIGHT: 67 IN | DIASTOLIC BLOOD PRESSURE: 74 MMHG | BODY MASS INDEX: 34.21 KG/M2 | SYSTOLIC BLOOD PRESSURE: 122 MMHG | OXYGEN SATURATION: 96 %

## 2022-07-19 DIAGNOSIS — N94.10 DYSPAREUNIA IN FEMALE: ICD-10-CM

## 2022-07-19 DIAGNOSIS — N93.9 SCANT VAGINAL BLEEDING: ICD-10-CM

## 2022-07-19 PROBLEM — R10.2 PELVIC PAIN: Status: ACTIVE | Noted: 2022-07-19

## 2022-07-19 PROCEDURE — 99214 OFFICE O/P EST MOD 30 MIN: CPT | Performed by: NURSE PRACTITIONER

## 2022-07-19 ASSESSMENT — PATIENT HEALTH QUESTIONNAIRE - PHQ9: CLINICAL INTERPRETATION OF PHQ2 SCORE: 0

## 2022-07-19 ASSESSMENT — FIBROSIS 4 INDEX: FIB4 SCORE: 0.93

## 2022-07-20 NOTE — PROGRESS NOTES
CC:   Chief Complaint   Patient presents with   • Vaginal Bleeding     HISTORY OF THE PRESENT ILLNESS: Patient is a 57 y.o. female. This pleasant patient is here today to discuss vaginal bleeding after intercourse.    Health Maintenance: Reviewed    Dyspareunia in female  Patient reports that she has had intercourse with 2 different partners 4 times in the last 3 years. The first partner 3 years ago she only had intercourse with once, used a condom, and tested positive for trichomonas after.  The last 3 times that she has had intercourse has been with the same person and she uses a sheepskin condom due to an allergy to latex. Reports that she has always had some degree of pain/discomfort with intercourse, but these last 4 times she has had a scant amount of bleeding after each time. She does not have bleeding vaginal or postmenopausal bleeding at any other time. Denies pelvic pain. She did see gynecology in July 2020 for annual gynecological exam and had a Pap smear completed and a vaginal pathogen swab that was negative for Candida and Gardnerella but positive for trichomonas. She was treated with Flagyl 2 g for 1 dose. In review of the exam at that appointment there was a moderate amount of yellowish discharge and it was noted that the cervix was without lesions. There was an area of vulvar irritation noted on the right and she was prescribed a course of topical triamcinolone cream to use twice daily for 7-10 days, reports that she had a reaction to the triamcinolone.  It was recommended to follow-up for a biopsy, the patient does not recall this recommendation and a biopsy was not scheduled, she does not know what the provider wanted to biopsy. Patient is postmenopausal, LMP unknown.    Allergies: Codeine, Aspirin, Ibuprofen, Influenza vaccines, Latex, Tylenol arthritis pain, and Vicodin [hydrocodone-acetaminophen]  No current [facility]-ordered outpatient medications on file.     No current Epic-ordered  "facility-administered medications on file.     Past Medical History:   Diagnosis Date   • Arthritis    • Delayed emergence from general anesthesia    • Head ache     post    • Left hand tendonitis 2020   • Mixed hyperlipidemia 8/3/2018   • Right hand pain 2019     Past Surgical History:   Procedure Laterality Date   • PB REVISE MEDIAN N/CARPAL TUNNEL SURG Right 2/10/2020    Procedure: RELEASE, CARPAL TUNNEL;  Surgeon: Georgina Steinberg M.D.;  Location: SURGERY [facility];  Service: Orthopedics   • LIPOMA EXCISION Left     wrist   • PRIMARY C SECTION  1988   • LUMPECTOMY Bilateral      Social History     Tobacco Use   • Smoking status: Never Smoker   • Smokeless tobacco: Never Used   Vaping Use   • Vaping Use: Never used   Substance Use Topics   • Alcohol use: Yes     Comment: once a month   • Drug use: No     Social History     Social History Narrative   • Not on file     Family History   Problem Relation Age of Onset   • Heart Disease Mother    • Lung Disease Mother    • No Known Problems Father    • Other Sister         Gum disease   • Heart Disease Sister    • Other Brother         MS   • Heart Attack Brother    • Lung Disease Brother    • Cancer Brother         mouth cancer r/t chewing tobacco   • Alcohol/Drug Brother         Yovanny ETOH   • Lung Disease Maternal Grandmother    • Kidney cancer Maternal Grandmother    • Lung Cancer Maternal Grandmother    • No Known Problems Paternal Grandmother    • No Known Problems Sister      ROS:   See HPI      Exam: /74 (BP Location: Left arm, Patient Position: Sitting, BP Cuff Size: Large adult)   Pulse (!) 101   Temp 36.1 °C (97 °F) (Temporal)   Ht 1.702 m (5' 7\")   Wt 98.9 kg (218 lb)   SpO2 96%  Body mass index is 34.14 kg/m².    General: Well nourished, well developed female in NAD, awake and conversant.  Eyes: Normal conjunctiva, anicteric.  Round symmetrical pupils.  ENT: Hearing grossly intact.  No nasal discharge.  Neck: " Neck is supple.  No masses or thyromegaly.  CV: No lower extremity edema.  Respiratory: Respirations are nonlabored.  No wheezing.  Abdomen: Non-Distended.  Pelvic exam:  Perineum: No external lesions are noted, color is symmetrical throughout  Vagina: Vaginal vault is well rugated, atrophy   Cervix: Parous, non friable, small red cervical polyp  Uterus: Normal shape, position and consistency  Bimanual: no adnexal masses or tenderness, no cervical motion tenderness, no uterine tenderness  Rectal: not performed   Skin: Warm.  No rashes or ulcers.  MSK: Normal ambulation.  No clubbing or cyanosis.  Neuro: Sensation and CN II-XII grossly normal.  Psych: Alert and oriented.  Cooperative, appropriate mood and affect, normal judgment.      A chaperone was offered to the patient during today's exam. Chaperone name: ANN Khan Student was present.     Assessment/Plan:  1. Dyspareunia in female  2. Scant vaginal bleeding  The patient was referred to another gynecologist office and has scheduled an appointment on Thursday 7/21/22, but she is concerned that she has not been able to get a hold of the office for confirmation of her appointment. In review of the chart, the patient thought that she had a pap smear with the last pelvic exam, but it turns out that it was only a vaginal pathogen swab. Patients last pap smear is correctly listed as 2018. Recommend that the patient notify new gynecologist that she is due for a pap or return to be seen with PCP for pap smear. She denies a history of abnormal pap smear. Patient will notify me through mychart if her appointment was cancelled or completed. Will place new referral if needed. She may return to update pap with PCP if needed.     Educated in proper administration of medication(s) ordered today including safety, possible SE, risks, benefits, rationale and alternatives to therapy.   Supportive care, differential diagnoses, and indications for immediate follow-up  discussed with patient.    Pathogenesis of diagnosis discussed including typical length and natural progression.    Instructed to return to clinic or nearest emergency department for any change in condition, further concerns, or worsening of symptoms.  Patient states understanding of the plan of care and discharge instructions.    Return if symptoms worsen or fail to improve.    Please note that this dictation was created using voice recognition software. I have made every reasonable attempt to correct obvious errors, but I expect that there are errors of grammar and possibly content that I did not discover before finalizing the note.

## 2022-07-20 NOTE — ASSESSMENT & PLAN NOTE
Patient reports that she has had intercourse with 2 different partners 4 times in the last 3 years. The first partner 3 years ago she only had intercourse with once, used a condom, and tested positive for trichomonas after.  The last 3 times that she has had intercourse has been with the same person and she uses a sheepskin condom due to an allergy to latex. Reports that she has always had some degree of pain/discomfort with intercourse, but these last 4 times she has had a scant amount of bleeding after each time. She does not have bleeding vaginal or postmenopausal bleeding at any other time. Denies pelvic pain. She did see gynecology in July 2020 for annual gynecological exam and had a Pap smear completed and a vaginal pathogen swab that was negative for Candida and Gardnerella but positive for trichomonas. She was treated with Flagyl 2 g for 1 dose. In review of the exam at that appointment there was a moderate amount of yellowish discharge and it was noted that the cervix was without lesions. There was an area of vulvar irritation noted on the right and she was prescribed a course of topical triamcinolone cream to use twice daily for 7-10 days, reports that she had a reaction to the triamcinolone.  It was recommended to follow-up for a biopsy, the patient does not recall this recommendation and a biopsy was not scheduled, she does not know what the provider wanted to biopsy. Patient is postmenopausal, LMP unknown.

## 2022-10-27 ENCOUNTER — HOSPITAL ENCOUNTER (OUTPATIENT)
Dept: RADIOLOGY | Facility: MEDICAL CENTER | Age: 57
End: 2022-10-27
Attending: NURSE PRACTITIONER
Payer: COMMERCIAL

## 2022-10-27 ENCOUNTER — OFFICE VISIT (OUTPATIENT)
Dept: MEDICAL GROUP | Facility: PHYSICIAN GROUP | Age: 57
End: 2022-10-27
Payer: COMMERCIAL

## 2022-10-27 VITALS
HEART RATE: 77 BPM | TEMPERATURE: 98 F | BODY MASS INDEX: 33.9 KG/M2 | OXYGEN SATURATION: 92 % | WEIGHT: 216 LBS | HEIGHT: 67 IN | DIASTOLIC BLOOD PRESSURE: 88 MMHG | SYSTOLIC BLOOD PRESSURE: 138 MMHG

## 2022-10-27 DIAGNOSIS — R22.31 MASS OF HAND, RIGHT: ICD-10-CM

## 2022-10-27 DIAGNOSIS — Z12.12 SCREENING FOR COLORECTAL CANCER: ICD-10-CM

## 2022-10-27 DIAGNOSIS — Z12.11 SCREENING FOR COLORECTAL CANCER: ICD-10-CM

## 2022-10-27 DIAGNOSIS — L98.9 LESION OF SKIN OF FACE: ICD-10-CM

## 2022-10-27 PROCEDURE — 99214 OFFICE O/P EST MOD 30 MIN: CPT | Performed by: NURSE PRACTITIONER

## 2022-10-27 PROCEDURE — 76882 US LMTD JT/FCL EVL NVASC XTR: CPT | Mod: RT

## 2022-10-27 RX ORDER — ESTRADIOL 10 UG/1
INSERT VAGINAL
COMMUNITY
Start: 2022-08-29

## 2022-10-27 ASSESSMENT — FIBROSIS 4 INDEX: FIB4 SCORE: 0.93

## 2022-10-27 NOTE — ASSESSMENT & PLAN NOTE
New to examiner.  Patient reports a history of tendinitis.  For the last 2 months she has had a lump on the top of her right hand.  Around that time, she lifted something heavy at work and is not sure if this is related.  She has been using a wrist brace for the last 2 weeks, it may be helping, but it also may be making things worse.

## 2022-11-02 NOTE — PROGRESS NOTES
CC:   Chief Complaint   Patient presents with    Hand Pain     HISTORY OF THE PRESENT ILLNESS: Patient is a 57 y.o. female. This pleasant patient is here today to discuss a lump on her right hand that is painful and present for 2 months.    Health Maintenance: Reviewed    Mass of hand, right  New to examiner.  Patient reports a history of tendinitis.  For the last 2 months she has had a lump on the top of her right hand.  Around that time, she lifted something heavy at work and is not sure if this is related.  She has been using a wrist brace for the last 2 weeks, it may be helping, but it also may be making things worse.    Allergies: Codeine, Aspirin, Ibuprofen, Influenza vaccines, Latex, Tylenol arthritis pain, and Vicodin [hydrocodone-acetaminophen]  Current Outpatient Medications Ordered in Epic   Medication Sig Dispense Refill    estradiol (VAGIFEM) 10 MCG Tab        No current Epic-ordered facility-administered medications on file.     Past Medical History:   Diagnosis Date    Arthritis     Delayed emergence from general anesthesia     Head ache     post     Left hand tendonitis 2020    Mixed hyperlipidemia 8/3/2018    Right hand pain 2019     Past Surgical History:   Procedure Laterality Date    NM NEUROPLASTY & OR TRANSPOS MEDIAN NRV CARPAL TASH Right 2/10/2020    Procedure: RELEASE, CARPAL TUNNEL;  Surgeon: Georgina Steinberg M.D.;  Location: SURGERY [facility];  Service: Orthopedics    LIPOMA EXCISION Left     wrist    PRIMARY C SECTION  1988    LUMPECTOMY Bilateral      Social History     Tobacco Use    Smoking status: Never    Smokeless tobacco: Never   Vaping Use    Vaping Use: Never used   Substance Use Topics    Alcohol use: Yes     Comment: once a month    Drug use: No     Social History     Social History Narrative    Not on file     Family History   Problem Relation Age of Onset    Heart Disease Mother     Lung Disease Mother     No Known Problems Father     Other  "Sister         Gum disease    Heart Disease Sister     Other Brother         MS    Heart Attack Brother     Lung Disease Brother     Cancer Brother         mouth cancer r/t chewing tobacco    Alcohol/Drug Brother         Yovanny ETOH    Lung Disease Maternal Grandmother     Kidney cancer Maternal Grandmother     Lung Cancer Maternal Grandmother     No Known Problems Paternal Grandmother     No Known Problems Sister      ROS:   See HPI      Exam: /88 (BP Location: Left arm, Patient Position: Sitting, BP Cuff Size: Large adult)   Pulse 77   Temp 36.7 °C (98 °F) (Temporal)   Ht 1.702 m (5' 7\")   Wt 98 kg (216 lb)   SpO2 92%  Body mass index is 33.83 kg/m².    General: Well nourished, well developed female in NAD, awake and conversant.  Eyes: Normal conjunctiva, anicteric.  Round symmetrical pupils.  ENT: Hearing grossly intact.  No nasal discharge.  Neck: Neck is supple.  No masses or thyromegaly.  CV: No lower extremity edema.  Respiratory: Respirations are nonlabored.  No wheezing.  Abdomen: Non-Distended.  Skin: Warm.  No rashes or ulcers.  Lump on right hand.  MSK: Normal ambulation.  No clubbing or cyanosis.  Neuro: Sensation and CN II-XII grossly normal.  Psych: Alert and oriented.  Cooperative, appropriate mood and affect, normal judgment.      Assessment/Plan:  1. Mass of hand, right  New to examiner.  Plan to have patient complete ultrasound of right hand, referral to hand surgery.  - US-EXTREMITY NON VASCULAR UNILATERAL RIGHT; Future  - Referral to Hand Surgery    2. Lesion of skin of face  Referral to dermatology for evaluation and treatment.  - Referral to Dermatology    3. Screening for colorectal cancer  Due for screening.  - COLOGUARD (FIT DNA)     Educated in proper administration of medication(s) ordered today including safety, possible SE, risks, benefits, rationale and alternatives to therapy.   Supportive care, differential diagnoses, and indications for immediate follow-up discussed with " patient.    Pathogenesis of diagnosis discussed including typical length and natural progression.    Instructed to return to clinic or nearest emergency department for any change in condition, further concerns, or worsening of symptoms.  Patient states understanding of the plan of care and discharge instructions.    Return in about 6 weeks (around 12/7/2022) for Preventative Annual, As needed.    Please note that this dictation was created using voice recognition software. I have made every reasonable attempt to correct obvious errors, but I expect that there are errors of grammar and possibly content that I did not discover before finalizing the note.

## 2022-11-16 ENCOUNTER — HOSPITAL ENCOUNTER (OUTPATIENT)
Dept: RADIOLOGY | Facility: MEDICAL CENTER | Age: 57
End: 2022-11-16
Attending: NURSE PRACTITIONER
Payer: COMMERCIAL

## 2022-11-16 DIAGNOSIS — Z12.31 VISIT FOR SCREENING MAMMOGRAM: ICD-10-CM

## 2022-11-16 PROCEDURE — 77063 BREAST TOMOSYNTHESIS BI: CPT

## 2022-12-01 ENCOUNTER — APPOINTMENT (RX ONLY)
Dept: URBAN - METROPOLITAN AREA CLINIC 4 | Facility: CLINIC | Age: 57
Setting detail: DERMATOLOGY
End: 2022-12-01

## 2022-12-01 DIAGNOSIS — L81.4 OTHER MELANIN HYPERPIGMENTATION: ICD-10-CM

## 2022-12-01 DIAGNOSIS — D18.0 HEMANGIOMA: ICD-10-CM

## 2022-12-01 DIAGNOSIS — L57.0 ACTINIC KERATOSIS: ICD-10-CM

## 2022-12-01 PROBLEM — D18.01 HEMANGIOMA OF SKIN AND SUBCUTANEOUS TISSUE: Status: ACTIVE | Noted: 2022-12-01

## 2022-12-01 PROCEDURE — 99203 OFFICE O/P NEW LOW 30 MIN: CPT

## 2022-12-01 PROCEDURE — ? COUNSELING

## 2022-12-01 PROCEDURE — ? SUNSCREEN RECOMMENDATIONS

## 2022-12-01 PROCEDURE — ? PRESCRIPTION

## 2022-12-01 PROCEDURE — ? MEDICATION COUNSELING

## 2022-12-01 RX ORDER — FLUOROURACIL 2 G/40G
CREAM TOPICAL BID
Qty: 40 | Refills: 0 | Status: ERX | COMMUNITY
Start: 2022-12-01

## 2022-12-01 RX ADMIN — FLUOROURACIL 1: 2 CREAM TOPICAL at 00:00

## 2022-12-01 ASSESSMENT — LOCATION DETAILED DESCRIPTION DERM
LOCATION DETAILED: RIGHT LATERAL FOREHEAD
LOCATION DETAILED: RIGHT FOREHEAD
LOCATION DETAILED: LEFT INFERIOR MEDIAL FOREHEAD

## 2022-12-01 ASSESSMENT — LOCATION SIMPLE DESCRIPTION DERM
LOCATION SIMPLE: RIGHT FOREHEAD
LOCATION SIMPLE: LEFT FOREHEAD

## 2022-12-01 ASSESSMENT — LOCATION ZONE DERM: LOCATION ZONE: FACE

## 2022-12-01 NOTE — PROCEDURE: MEDICATION COUNSELING
Xolair Counseling:  Patient informed of potential adverse effects including but not limited to fever, muscle aches, rash and allergic reactions.  The patient verbalized understanding of the proper use and possible adverse effects of Xolair.  All of the patient's questions and concerns were addressed.
Metronidazole Pregnancy And Lactation Text: This medication is Pregnancy Category B and considered safe during pregnancy.  It is also excreted in breast milk.
Protopic Counseling: Patient may experience a mild burning sensation during topical application. Protopic is not approved in children less than 2 years of age. There have been case reports of hematologic and skin malignancies in patients using topical calcineurin inhibitors although causality is questionable.
Sarecycline Counseling: Patient advised regarding possible photosensitivity and discoloration of the teeth, skin, lips, tongue and gums.  Patient instructed to avoid sunlight, if possible.  When exposed to sunlight, patients should wear protective clothing, sunglasses, and sunscreen.  The patient was instructed to call the office immediately if the following severe adverse effects occur:  hearing changes, easy bruising/bleeding, severe headache, or vision changes.  The patient verbalized understanding of the proper use and possible adverse effects of sarecycline.  All of the patient's questions and concerns were addressed.
Itraconazole Pregnancy And Lactation Text: This medication is Pregnancy Category C and it isn't know if it is safe during pregnancy. It is also excreted in breast milk.
Eucrisa Counseling: Patient may experience a mild burning sensation during topical application. Eucrisa is not approved in children less than 3 months of age.
Cimzia Counseling:  I discussed with the patient the risks of Cimzia including but not limited to immunosuppression, allergic reactions and infections.  The patient understands that monitoring is required including a PPD at baseline and must alert us or the primary physician if symptoms of infection or other concerning signs are noted.
Wartpeel Pregnancy And Lactation Text: This medication is Pregnancy Category X and contraindicated in pregnancy and in women who may become pregnant. It is unknown if this medication is excreted in breast milk.
Klisyri Pregnancy And Lactation Text: It is unknown if this medication can harm a developing fetus or if it is excreted in breast milk.
Humira Pregnancy And Lactation Text: This medication is Pregnancy Category B and is considered safe during pregnancy. It is unknown if this medication is excreted in breast milk.
Cibinqo Counseling: I discussed with the patient the risks of Cibinqo therapy including but not limited to common cold, nausea, headache, cold sores, increased blood CPK levels, dizziness, UTIs, fatigue, acne, and vomitting. Live vaccines should be avoided.  This medication has been linked to serious infections; higher rate of mortality; malignancy and lymphoproliferative disorders; major adverse cardiovascular events; thrombosis; thrombocytopenia and lymphopenia; lipid elevations; and retinal detachment.
Olanzapine Counseling- I discussed with the patient the common side effects of olanzapine including but are not limited to: lack of energy, dry mouth, increased appetite, sleepiness, tremor, constipation, dizziness, changes in behavior, or restlessness.  Explained that teenagers are more likely to experience headaches, abdominal pain, pain in the arms or legs, tiredness, and sleepiness.  Serious side effects include but are not limited: increased risk of death in elderly patients who are confused, have memory loss, or dementia-related psychosis; hyperglycemia; increased cholesterol and triglycerides; and weight gain.
Tazorac Counseling:  Patient advised that medication is irritating and drying.  Patient may need to apply sparingly and wash off after an hour before eventually leaving it on overnight.  The patient verbalized understanding of the proper use and possible adverse effects of tazorac.  All of the patient's questions and concerns were addressed.
SSKI Counseling:  I discussed with the patient the risks of SSKI including but not limited to thyroid abnormalities, metallic taste, GI upset, fever, headache, acne, arthralgias, paraesthesias, lymphadenopathy, easy bleeding, arrhythmias, and allergic reaction.
Cyclophosphamide Counseling:  I discussed with the patient the risks of cyclophosphamide including but not limited to hair loss, hormonal abnormalities, decreased fertility, abdominal pain, diarrhea, nausea and vomiting, bone marrow suppression and infection. The patient understands that monitoring is required while taking this medication.
Aklief counseling:  Patient advised to apply a pea-sized amount only at bedtime and wait 30 minutes after washing their face before applying.  If too drying, patient may add a non-comedogenic moisturizer.  The most commonly reported side effects including irritation, redness, scaling, dryness, stinging, burning, itching, and increased risk of sunburn.  The patient verbalized understanding of the proper use and possible adverse effects of retinoids.  All of the patient's questions and concerns were addressed.
Cibinqo Pregnancy And Lactation Text: It is unknown if this medication will adversely affect pregnancy or breast feeding.  You should not take this medication if you are currently pregnant or planning a pregnancy or while breastfeeding.
Cephalexin Counseling: I counseled the patient regarding use of cephalexin as an antibiotic for prophylactic and/or therapeutic purposes. Cephalexin (commonly prescribed under brand name Keflex) is a cephalosporin antibiotic which is active against numerous classes of bacteria, including most skin bacteria. Side effects may include nausea, diarrhea, gastrointestinal upset, rash, hives, yeast infections, and in rare cases, hepatitis, kidney disease, seizures, fever, confusion, neurologic symptoms, and others. Patients with severe allergies to penicillin medications are cautioned that there is about a 10% incidence of cross-reactivity with cephalosporins. When possible, patients with penicillin allergies should use alternatives to cephalosporins for antibiotic therapy.
Nsaids Counseling: NSAID Counseling: I discussed with the patient that NSAIDs should be taken with food. Prolonged use of NSAIDs can result in the development of stomach ulcers.  Patient advised to stop taking NSAIDs if abdominal pain occurs.  The patient verbalized understanding of the proper use and possible adverse effects of NSAIDs.  All of the patient's questions and concerns were addressed.
Simponi Pregnancy And Lactation Text: The risk during pregnancy and breastfeeding is uncertain with this medication.
Gabapentin Pregnancy And Lactation Text: This medication is Pregnancy Category C and isn't considered safe during pregnancy. It is excreted in breast milk.
Hydroxyzine Counseling: Patient advised that the medication is sedating and not to drive a car after taking this medication.  Patient informed of potential adverse effects including but not limited to dry mouth, urinary retention, and blurry vision.  The patient verbalized understanding of the proper use and possible adverse effects of hydroxyzine.  All of the patient's questions and concerns were addressed.
Finasteride Pregnancy And Lactation Text: This medication is absolutely contraindicated during pregnancy. It is unknown if it is excreted in breast milk.
Calcipotriene Pregnancy And Lactation Text: This medication has not been proven safe during pregnancy. It is unknown if this medication is excreted in breast milk.
Minoxidil Counseling: Minoxidil is a topical medication which can increase blood flow where it is applied. It is uncertain how this medication increases hair growth. Side effects are uncommon and include stinging and allergic reactions.
Acitretin Counseling:  I discussed with the patient the risks of acitretin including but not limited to hair loss, dry lips/skin/eyes, liver damage, hyperlipidemia, depression/suicidal ideation, photosensitivity.  Serious rare side effects can include but are not limited to pancreatitis, pseudotumor cerebri, bony changes, clot formation/stroke/heart attack.  Patient understands that alcohol is contraindicated since it can result in liver toxicity and significantly prolong the elimination of the drug by many years.
Ketoconazole Counseling:   Patient counseled regarding improving absorption with orange juice.  Adverse effects include but are not limited to breast enlargement, headache, diarrhea, nausea, upset stomach, liver function test abnormalities, taste disturbance, and stomach pain.  There is a rare possibility of liver failure that can occur when taking ketoconazole. The patient understands that monitoring of LFTs may be required, especially at baseline. The patient verbalized understanding of the proper use and possible adverse effects of ketoconazole.  All of the patient's questions and concerns were addressed.
Cephalexin Pregnancy And Lactation Text: This medication is Pregnancy Category B and considered safe during pregnancy.  It is also excreted in breast milk but can be used safely for shorter doses.
Winlevi Counseling:  I discussed with the patient the risks of topical clascoterone including but not limited to erythema, scaling, itching, and stinging. Patient voiced their understanding.
Nsaids Pregnancy And Lactation Text: These medications are considered safe up to 30 weeks gestation. It is excreted in breast milk.
Arava Counseling:  Patient counseled regarding adverse effects of Arava including but not limited to nausea, vomiting, abnormalities in liver function tests. Patients may develop mouth sores, rash, diarrhea, and abnormalities in blood counts. The patient understands that monitoring is required including LFTs and blood counts.  There is a rare possibility of scarring of the liver and lung problems that can occur when taking methotrexate. Persistent nausea, loss of appetite, pale stools, dark urine, cough, and shortness of breath should be reported immediately. Patient advised to discontinue Arava treatment and consult with a physician prior to attempting conception. The patient will have to undergo a treatment to eliminate Arava from the body prior to conception.
Olanzapine Pregnancy And Lactation Text: This medication is pregnancy category C.   There are no adequate and well controlled trials with olanzapine in pregnant females.  Olanzapine should be used during pregnancy only if the potential benefit justifies the potential risk to the fetus.   In a study in lactating healthy women, olanzapine was excreted in breast milk.  It is recommended that women taking olanzapine should not breast feed.
Ilumya Counseling: I discussed with the patient the risks of tildrakizumab including but not limited to immunosuppression, malignancy, posterior leukoencephalopathy syndrome, and serious infections.  The patient understands that monitoring is required including a PPD at baseline and must alert us or the primary physician if symptoms of infection or other concerning signs are noted.
Minocycline Counseling: Patient advised regarding possible photosensitivity and discoloration of the teeth, skin, lips, tongue and gums.  Patient instructed to avoid sunlight, if possible.  When exposed to sunlight, patients should wear protective clothing, sunglasses, and sunscreen.  The patient was instructed to call the office immediately if the following severe adverse effects occur:  hearing changes, easy bruising/bleeding, severe headache, or vision changes.  The patient verbalized understanding of the proper use and possible adverse effects of minocycline.  All of the patient's questions and concerns were addressed.
Cimzia Pregnancy And Lactation Text: This medication crosses the placenta but can be considered safe in certain situations. Cimzia may be excreted in breast milk.
Protopic Pregnancy And Lactation Text: This medication is Pregnancy Category C. It is unknown if this medication is excreted in breast milk when applied topically.
Sarecycline Pregnancy And Lactation Text: This medication is Pregnancy Category D and not consider safe during pregnancy. It is also excreted in breast milk.
Xolair Pregnancy And Lactation Text: This medication is Pregnancy Category B and is considered safe during pregnancy. This medication is excreted in breast milk.
Cyclophosphamide Pregnancy And Lactation Text: This medication is Pregnancy Category D and it isn't considered safe during pregnancy. This medication is excreted in breast milk.
Olumiant Counseling: I discussed with the patient the risks of Olumiant therapy including but not limited to upper respiratory tract infections, shingles, cold sores, and nausea. Live vaccines should be avoided.  This medication has been linked to serious infections; higher rate of mortality; malignancy and lymphoproliferative disorders; major adverse cardiovascular events; thrombosis; gastrointestinal perforations; neutropenia; lymphopenia; anemia; liver enzyme elevations; and lipid elevations.
Glycopyrrolate Counseling:  I discussed with the patient the risks of glycopyrrolate including but not limited to skin rash, drowsiness, dry mouth, difficulty urinating, and blurred vision.
Qbrexza Counseling:  I discussed with the patient the risks of Qbrexza including but not limited to headache, mydriasis, blurred vision, dry eyes, nasal dryness, dry mouth, dry throat, dry skin, urinary hesitation, and constipation.  Local skin reactions including erythema, burning, stinging, and itching can also occur.
Oral Minoxidil Counseling- I discussed with the patient the risks of oral minoxidil including but not limited to shortness of breath, swelling of the feet or ankles, dizziness, lightheadedness, unwanted hair growth and allergic reaction.  The patient verbalized understanding of the proper use and possible adverse effects of oral minoxidil.  All of the patient's questions and concerns were addressed.
Arava Pregnancy And Lactation Text: This medication is Pregnancy Category X and is absolutely contraindicated during pregnancy. It is unknown if it is excreted in breast milk.
Birth Control Pills Counseling: Birth Control Pill Counseling: I discussed with the patient the potential side effects of OCPs including but not limited to increased risk of stroke, heart attack, thrombophlebitis, deep venous thrombosis, hepatic adenomas, breast changes, GI upset, headaches, and depression.  The patient verbalized understanding of the proper use and possible adverse effects of OCPs. All of the patient's questions and concerns were addressed.
Tazorac Pregnancy And Lactation Text: This medication is not safe during pregnancy. It is unknown if this medication is excreted in breast milk.
Skyrizi Counseling: I discussed with the patient the risks of risankizumab-rzaa including but not limited to immunosuppression, and serious infections.  The patient understands that monitoring is required including a PPD at baseline and must alert us or the primary physician if symptoms of infection or other concerning signs are noted.
Hydroxyzine Pregnancy And Lactation Text: This medication is not safe during pregnancy and should not be taken. It is also excreted in breast milk and breast feeding isn't recommended.
Cantharidin Counseling: Calcipotriene Counseling:  I discussed with the patient the risks of calcipotriene including but not limited to erythema, scaling, itching, and irritation.
Detail Level: Simple
Aklief Pregnancy And Lactation Text: It is unknown if this medication is safe to use during pregnancy.  It is unknown if this medication is excreted in breast milk.  Breastfeeding women should use the topical cream on the smallest area of the skin for the shortest time needed while breastfeeding.  Do not apply to nipple and areola.
Sski Pregnancy And Lactation Text: This medication is Pregnancy Category D and isn't considered safe during pregnancy. It is excreted in breast milk.
Libtayo Counseling- I discussed with the patient the risks of Libtayo including but not limited to nausea, vomiting, diarrhea, and bone or muscle pain.  The patient verbalized understanding of the proper use and possible adverse effects of Libtayo.  All of the patient's questions and concerns were addressed.
Clindamycin Counseling: I counseled the patient regarding use of clindamycin as an antibiotic for prophylactic and/or therapeutic purposes. Clindamycin is active against numerous classes of bacteria, including skin bacteria. Side effects may include nausea, diarrhea, gastrointestinal upset, rash, hives, yeast infections, and in rare cases, colitis.
Winlevi Pregnancy And Lactation Text: This medication is considered safe during pregnancy and breastfeeding.
Birth Control Pills Pregnancy And Lactation Text: This medication should be avoided if pregnant and for the first 30 days post-partum.
Cantharidin Pregnancy And Lactation Text: The use of this medication during pregnancy or lactation is not recommended as there is insufficient data.
Topical Clindamycin Counseling: Patient counseled that this medication may cause skin irritation or allergic reactions.  In the event of skin irritation, the patient was advised to reduce the amount of the drug applied or use it less frequently.   The patient verbalized understanding of the proper use and possible adverse effects of clindamycin.  All of the patient's questions and concerns were addressed.
Minoxidil Pregnancy And Lactation Text: This medication has not been assigned a Pregnancy Risk Category but animal studies failed to show danger with the topical medication. It is unknown if the medication is excreted in breast milk.
Cosentyx Counseling:  I discussed with the patient the risks of Cosentyx including but not limited to worsening of Crohn's disease, immunosuppression, allergic reactions and infections.  The patient understands that monitoring is required including a PPD at baseline and must alert us or the primary physician if symptoms of infection or other concerning signs are noted.
Acitretin Pregnancy And Lactation Text: This medication is Pregnancy Category X and should not be given to women who are pregnant or may become pregnant in the future. This medication is excreted in breast milk.
Tetracycline Counseling: Patient counseled regarding possible photosensitivity and increased risk for sunburn.  Patient instructed to avoid sunlight, if possible.  When exposed to sunlight, patients should wear protective clothing, sunglasses, and sunscreen.  The patient was instructed to call the office immediately if the following severe adverse effects occur:  hearing changes, easy bruising/bleeding, severe headache, or vision changes.  The patient verbalized understanding of the proper use and possible adverse effects of tetracycline.  All of the patient's questions and concerns were addressed. Patient understands to avoid pregnancy while on therapy due to potential birth defects.
Ketoconazole Pregnancy And Lactation Text: This medication is Pregnancy Category C and it isn't know if it is safe during pregnancy. It is also excreted in breast milk and breast feeding isn't recommended.
Clofazimine Counseling:  I discussed with the patient the risks of clofazimine including but not limited to skin and eye pigmentation, liver damage, nausea/vomiting, gastrointestinal bleeding and allergy.
Quinolones Counseling:  I discussed with the patient the risks of fluoroquinolones including but not limited to GI upset, allergic reaction, drug rash, diarrhea, dizziness, photosensitivity, yeast infections, liver function test abnormalities, tendonitis/tendon rupture.
Qbrexza Pregnancy And Lactation Text: There is no available data on Qbrexza use in pregnant women.  There is no available data on Qbrexza use in lactation.
Albendazole Counseling:  I discussed with the patient the risks of albendazole including but not limited to cytopenia, kidney damage, nausea/vomiting and severe allergy.  The patient understands that this medication is being used in an off-label manner.
Bexarotene Counseling:  I discussed with the patient the risks of bexarotene including but not limited to hair loss, dry lips/skin/eyes, liver abnormalities, hyperlipidemia, pancreatitis, depression/suicidal ideation, photosensitivity, drug rash/allergic reactions, hypothyroidism, anemia, leukopenia, infection, cataracts, and teratogenicity.  Patient understands that they will need regular blood tests to check lipid profile, liver function tests, white blood cell count, thyroid function tests and pregnancy test if applicable.
Oral Minoxidil Pregnancy And Lactation Text: This medication should only be used when clearly needed if you are pregnant, attempting to become pregnant or breast feeding.
Glycopyrrolate Pregnancy And Lactation Text: This medication is Pregnancy Category B and is considered safe during pregnancy. It is unknown if it is excreted breast milk.
Erivedge Counseling- I discussed with the patient the risks of Erivedge including but not limited to nausea, vomiting, diarrhea, constipation, weight loss, changes in the sense of taste, decreased appetite, muscle spasms, and hair loss.  The patient verbalized understanding of the proper use and possible adverse effects of Erivedge.  All of the patient's questions and concerns were addressed.
Azelaic Acid Counseling: Patient counseled that medicine may cause skin irritation and to avoid applying near the eyes.  In the event of skin irritation, the patient was advised to reduce the amount of the drug applied or use it less frequently.   The patient verbalized understanding of the proper use and possible adverse effects of azelaic acid.  All of the patient's questions and concerns were addressed.
Include Pregnancy/Lactation Warning?: No
Cyclosporine Counseling:  I discussed with the patient the risks of cyclosporine including but not limited to hypertension, gingival hyperplasia,myelosuppression, immunosuppression, liver damage, kidney damage, neurotoxicity, lymphoma, and serious infections. The patient understands that monitoring is required including baseline blood pressure, CBC, CMP, lipid panel and uric acid, and then 1-2 times monthly CMP and blood pressure.
Thalidomide Counseling: I discussed with the patient the risks of thalidomide including but not limited to birth defects, anxiety, weakness, chest pain, dizziness, cough and severe allergy.
Olumiant Pregnancy And Lactation Text: Based on animal studies, Olumiant may cause embryo-fetal harm when administered to pregnant women.  The medication should not be used in pregnancy.  Breastfeeding is not recommended during treatment.
Infliximab Counseling:  I discussed with the patient the risks of infliximab including but not limited to myelosuppression, immunosuppression, autoimmune hepatitis, demyelinating diseases, lymphoma, and serious infections.  The patient understands that monitoring is required including a PPD at baseline and must alert us or the primary physician if symptoms of infection or other concerning signs are noted.
Spironolactone Counseling: Patient advised regarding risks of diarrhea, abdominal pain, hyperkalemia, birth defects (for female patients), liver toxicity and renal toxicity. The patient may need blood work to monitor liver and kidney function and potassium levels while on therapy. The patient verbalized understanding of the proper use and possible adverse effects of spironolactone.  All of the patient's questions and concerns were addressed.
Topical Clindamycin Pregnancy And Lactation Text: This medication is Pregnancy Category B and is considered safe during pregnancy. It is unknown if it is excreted in breast milk.
Cyclosporine Pregnancy And Lactation Text: This medication is Pregnancy Category C and it isn't know if it is safe during pregnancy. This medication is excreted in breast milk.
Hydroxychloroquine Counseling:  I discussed with the patient that a baseline ophthalmologic exam is needed at the start of therapy and every year thereafter while on therapy. A CBC may also be warranted for monitoring.  The side effects of this medication were discussed with the patient, including but not limited to agranulocytosis, aplastic anemia, seizures, rashes, retinopathy, and liver toxicity. Patient instructed to call the office should any adverse effect occur.  The patient verbalized understanding of the proper use and possible adverse effects of Plaquenil.  All the patient's questions and concerns were addressed.
VTAMA Counseling: I discussed with the patient that VTAMA is not for use in the eyes, mouth or mouth. They should call the office if they develop any signs of allergic reactions to VTAMA. The patient verbalized understanding of the proper use and possible adverse effects of VTAMA.  All of the patient's questions and concerns were addressed.
Libtayo Pregnancy And Lactation Text: This medication is contraindicated in pregnancy and when breast feeding.
Clindamycin Pregnancy And Lactation Text: This medication can be used in pregnancy if certain situations. Clindamycin is also present in breast milk.
Mirvaso Counseling: Mirvaso is a topical medication which can decrease superficial blood flow where applied. Side effects are uncommon and include stinging, redness and allergic reactions.
Terbinafine Counseling: Patient counseling regarding adverse effects of terbinafine including but not limited to headache, diarrhea, rash, upset stomach, liver function test abnormalities, itching, taste/smell disturbance, nausea, abdominal pain, and flatulence.  There is a rare possibility of liver failure that can occur when taking terbinafine.  The patient understands that a baseline LFT and kidney function test may be required. The patient verbalized understanding of the proper use and possible adverse effects of terbinafine.  All of the patient's questions and concerns were addressed.
5-Fu Counseling: 5-Fluorouracil Counseling:  I discussed with the patient the risks of 5-fluorouracil including but not limited to erythema, scaling, itching, weeping, crusting, and pain.
Stelara Counseling:  I discussed with the patient the risks of ustekinumab including but not limited to immunosuppression, malignancy, posterior leukoencephalopathy syndrome, and serious infections.  The patient understands that monitoring is required including a PPD at baseline and must alert us or the primary physician if symptoms of infection or other concerning signs are noted.
Otezla Counseling: The side effects of Otezla were discussed with the patient, including but not limited to worsening or new depression, weight loss, diarrhea, nausea, upper respiratory tract infection, and headache. Patient instructed to call the office should any adverse effect occur.  The patient verbalized understanding of the proper use and possible adverse effects of Otezla.  All the patient's questions and concerns were addressed.
Mirvaso Pregnancy And Lactation Text: This medication has not been assigned a Pregnancy Risk Category. It is unknown if the medication is excreted in breast milk.
Terbinafine Pregnancy And Lactation Text: This medication is Pregnancy Category B and is considered safe during pregnancy. It is also excreted in breast milk and breast feeding isn't recommended.
Odomzo Counseling- I discussed with the patient the risks of Odomzo including but not limited to nausea, vomiting, diarrhea, constipation, weight loss, changes in the sense of taste, decreased appetite, muscle spasms, and hair loss.  The patient verbalized understanding of the proper use and possible adverse effects of Odomzo.  All of the patient's questions and concerns were addressed.
Dupixent Counseling: I discussed with the patient the risks of dupilumab including but not limited to eye infection and irritation, cold sores, injection site reactions, worsening of asthma, allergic reactions and increased risk of parasitic infection.  Live vaccines should be avoided while taking dupilumab. Dupilumab will also interact with certain medications such as warfarin and cyclosporine. The patient understands that monitoring is required and they must alert us or the primary physician if symptoms of infection or other concerning signs are noted.
Vtama Pregnancy And Lactation Text: It is unknown if this medication can cause problems during pregnancy and breastfeeding.
Rhofade Counseling: Rhofade is a topical medication which can decrease superficial blood flow where applied. Side effects are uncommon and include stinging, redness and allergic reactions.
Azelaic Acid Pregnancy And Lactation Text: This medication is considered safe during pregnancy and breast feeding.
Rinvoq Counseling: I discussed with the patient the risks of Rinvoq therapy including but not limited to upper respiratory tract infections, shingles, cold sores, bronchitis, nausea, cough, fever, acne, and headache. Live vaccines should be avoided.  This medication has been linked to serious infections; higher rate of mortality; malignancy and lymphoproliferative disorders; major adverse cardiovascular events; thrombosis; thrombocytopenia, anemia, and neutropenia; lipid elevations; liver enzyme elevations; and gastrointestinal perforations.
Albendazole Pregnancy And Lactation Text: This medication is Pregnancy Category C and it isn't known if it is safe during pregnancy. It is also excreted in breast milk.
Bexarotene Pregnancy And Lactation Text: This medication is Pregnancy Category X and should not be given to women who are pregnant or may become pregnant. This medication should not be used if you are breast feeding.
Rituxan Counseling:  I discussed with the patient the risks of Rituxan infusions. Side effects can include infusion reactions, severe drug rashes including mucocutaneous reactions, reactivation of latent hepatitis and other infections and rarely progressive multifocal leukoencephalopathy.  All of the patient's questions and concerns were addressed.
Hydroxychloroquine Pregnancy And Lactation Text: This medication has been shown to cause fetal harm but it isn't assigned a Pregnancy Risk Category. There are small amounts excreted in breast milk.
Topical Ketoconazole Counseling: Patient counseled that this medication may cause skin irritation or allergic reactions.  In the event of skin irritation, the patient was advised to reduce the amount of the drug applied or use it less frequently.   The patient verbalized understanding of the proper use and possible adverse effects of ketoconazole.  All of the patient's questions and concerns were addressed.
Benzoyl Peroxide Counseling: Patient counseled that medicine may cause skin irritation and bleach clothing.  In the event of skin irritation, the patient was advised to reduce the amount of the drug applied or use it less frequently.   The patient verbalized understanding of the proper use and possible adverse effects of benzoyl peroxide.  All of the patient's questions and concerns were addressed.
Ivermectin Counseling:  Patient instructed to take medication on an empty stomach with a full glass of water.  Patient informed of potential adverse effects including but not limited to nausea, diarrhea, dizziness, itching, and swelling of the extremities or lymph nodes.  The patient verbalized understanding of the proper use and possible adverse effects of ivermectin.  All of the patient's questions and concerns were addressed.
Doxycycline Counseling:  Patient counseled regarding possible photosensitivity and increased risk for sunburn.  Patient instructed to avoid sunlight, if possible.  When exposed to sunlight, patients should wear protective clothing, sunglasses, and sunscreen.  The patient was instructed to call the office immediately if the following severe adverse effects occur:  hearing changes, easy bruising/bleeding, severe headache, or vision changes.  The patient verbalized understanding of the proper use and possible adverse effects of doxycycline.  All of the patient's questions and concerns were addressed.
Hydroquinone Counseling:  Patient advised that medication may result in skin irritation, lightening (hypopigmentation), dryness, and burning.  In the event of skin irritation, the patient was advised to reduce the amount of the drug applied or use it less frequently.  Rarely, spots that are treated with hydroquinone can become darker (pseudoochronosis).  Should this occur, patient instructed to stop medication and call the office. The patient verbalized understanding of the proper use and possible adverse effects of hydroquinone.  All of the patient's questions and concerns were addressed.
Fluconazole Counseling:  Patient counseled regarding adverse effects of fluconazole including but not limited to headache, diarrhea, nausea, upset stomach, liver function test abnormalities, taste disturbance, and stomach pain.  There is a rare possibility of liver failure that can occur when taking fluconazole.  The patient understands that monitoring of LFTs and kidney function test may be required, especially at baseline. The patient verbalized understanding of the proper use and possible adverse effects of fluconazole.  All of the patient's questions and concerns were addressed.
Spironolactone Pregnancy And Lactation Text: This medication can cause feminization of the male fetus and should be avoided during pregnancy. The active metabolite is also found in breast milk.
Methotrexate Counseling:  Patient counseled regarding adverse effects of methotrexate including but not limited to nausea, vomiting, abnormalities in liver function tests. Patients may develop mouth sores, rash, diarrhea, and abnormalities in blood counts. The patient understands that monitoring is required including LFT's and blood counts.  There is a rare possibility of scarring of the liver and lung problems that can occur when taking methotrexate. Persistent nausea, loss of appetite, pale stools, dark urine, cough, and shortness of breath should be reported immediately. Patient advised to discontinue methotrexate treatment at least three months before attempting to become pregnant.  I discussed the need for folate supplements while taking methotrexate.  These supplements can decrease side effects during methotrexate treatment. The patient verbalized understanding of the proper use and possible adverse effects of methotrexate.  All of the patient's questions and concerns were addressed.
Tranexamic Acid Counseling:  Patient advised of the small risk of bleeding problems with tranexamic acid. They were also instructed to call if they developed any nausea, vomiting or diarrhea. All of the patient's questions and concerns were addressed.
Zyclara Counseling:  I discussed with the patient the risks of imiquimod including but not limited to erythema, scaling, itching, weeping, crusting, and pain.  Patient understands that the inflammatory response to imiquimod is variable from person to person and was educated regarded proper titration schedule.  If flu-like symptoms develop, patient knows to discontinue the medication and contact us.
Opzelura Counseling:  I discussed with the patient the risks of Opzelura including but not limited to nasopharngitis, bronchitis, ear infection, eosinophila, hives, diarrhea, folliculitis, tonsillitis, and rhinorrhea.  Taken orally, this medication has been linked to serious infections; higher rate of mortality; malignancy and lymphoproliferative disorders; major adverse cardiovascular events; thrombosis; thrombocytopenia, anemia, and neutropenia; and lipid elevations.
Doxycycline Pregnancy And Lactation Text: This medication is Pregnancy Category D and not consider safe during pregnancy. It is also excreted in breast milk but is considered safe for shorter treatment courses.
Drysol Counseling:  I discussed with the patient the risks of drysol/aluminum chloride including but not limited to skin rash, itching, irritation, burning.
Rinvoq Pregnancy And Lactation Text: Based on animal studies, Rinvoq may cause embryo-fetal harm when administered to pregnant women.  The medication should not be used in pregnancy.  Breastfeeding is not recommended during treatment and for 6 days after the last dose.
Colchicine Counseling:  Patient counseled regarding adverse effects including but not limited to stomach upset (nausea, vomiting, stomach pain, or diarrhea).  Patient instructed to limit alcohol consumption while taking this medication.  Colchicine may reduce blood counts especially with prolonged use.  The patient understands that monitoring of kidney function and blood counts may be required, especially at baseline. The patient verbalized understanding of the proper use and possible adverse effects of colchicine.  All of the patient's questions and concerns were addressed.
Otezla Pregnancy And Lactation Text: This medication is Pregnancy Category C and it isn't known if it is safe during pregnancy. It is unknown if it is excreted in breast milk.
Isotretinoin Counseling: Patient should get monthly blood tests, not donate blood, not drive at night if vision affected, not share medication, and not undergo elective surgery for 6 months after tx completed. Side effects reviewed, pt to contact office should one occur.
Dupixent Pregnancy And Lactation Text: This medication likely crosses the placenta but the risk for the fetus is uncertain. This medication is excreted in breast milk.
Sotyktu Counseling:  I discussed the most common side effects of Sotyktu including: common cold, sore throat, sinus infections, cold sores, canker sores, folliculitis, and acne.  I also discussed more serious side effects of Sotyktu including but not limited to: serious allergic reactions; increased risk for infections such as TB; cancers such as lymphomas; rhabdomyolysis and elevated CPK; and elevated triglycerides and liver enzymes. 
Solaraze Counseling:  I discussed with the patient the risks of Solaraze including but not limited to erythema, scaling, itching, weeping, crusting, and pain.
Azathioprine Counseling:  I discussed with the patient the risks of azathioprine including but not limited to myelosuppression, immunosuppression, hepatotoxicity, lymphoma, and infections.  The patient understands that monitoring is required including baseline LFTs, Creatinine, possible TPMP genotyping and weekly CBCs for the first month and then every 2 weeks thereafter.  The patient verbalized understanding of the proper use and possible adverse effects of azathioprine.  All of the patient's questions and concerns were addressed.
Oxybutynin Counseling:  I discussed with the patient the risks of oxybutynin including but not limited to skin rash, drowsiness, dry mouth, difficulty urinating, and blurred vision.
Isotretinoin Pregnancy And Lactation Text: This medication is Pregnancy Category X and is considered extremely dangerous during pregnancy. It is unknown if it is excreted in breast milk.
Tranexamic Acid Pregnancy And Lactation Text: It is unknown if this medication is safe during pregnancy or breast feeding.
Low Dose Naltrexone Counseling- I discussed with the patient the potential risks and side effects of low dose naltrexone including but not limited to: more vivid dreams, headaches, nausea, vomiting, abdominal pain, fatigue, dizziness, and anxiety.
Taltz Counseling: I discussed with the patient the risks of ixekizumab including but not limited to immunosuppression, serious infections, worsening of inflammatory bowel disease and drug reactions.  The patient understands that monitoring is required including a PPD at baseline and must alert us or the primary physician if symptoms of infection or other concerning signs are noted.
Methotrexate Pregnancy And Lactation Text: This medication is Pregnancy Category X and is known to cause fetal harm. This medication is excreted in breast milk.
Rituxan Pregnancy And Lactation Text: This medication is Pregnancy Category C and it isn't know if it is safe during pregnancy. It is unknown if this medication is excreted in breast milk but similar antibodies are known to be excreted.
Cimetidine Counseling:  I discussed with the patient the risks of Cimetidine including but not limited to gynecomastia, headache, diarrhea, nausea, drowsiness, arrhythmias, pancreatitis, skin rashes, psychosis, bone marrow suppression and kidney toxicity.
Benzoyl Peroxide Pregnancy And Lactation Text: This medication is Pregnancy Category C. It is unknown if benzoyl peroxide is excreted in breast milk.
Azithromycin Counseling:  I discussed with the patient the risks of azithromycin including but not limited to GI upset, allergic reaction, drug rash, diarrhea, and yeast infections.
Topical Sulfur Applications Counseling: Topical Sulfur Counseling: Patient counseled that this medication may cause skin irritation or allergic reactions.  In the event of skin irritation, the patient was advised to reduce the amount of the drug applied or use it less frequently.   The patient verbalized understanding of the proper use and possible adverse effects of topical sulfur application.  All of the patient's questions and concerns were addressed.
Valtrex Counseling: I discussed with the patient the risks of valacyclovir including but not limited to kidney damage, nausea, vomiting and severe allergy.  The patient understands that if the infection seems to be worsening or is not improving, they are to call.
Low Dose Naltrexone Pregnancy And Lactation Text: Naltrexone is pregnancy category C.  There have been no adequate and well-controlled studies in pregnant women.  It should be used in pregnancy only if the potential benefit justifies the potential risk to the fetus.   Limited data indicates that naltrexone is minimally excreted into breastmilk.
Griseofulvin Counseling:  I discussed with the patient the risks of griseofulvin including but not limited to photosensitivity, cytopenia, liver damage, nausea/vomiting and severe allergy.  The patient understands that this medication is best absorbed when taken with a fatty meal (e.g., ice cream or french fries).
Imiquimod Counseling:  I discussed with the patient the risks of imiquimod including but not limited to erythema, scaling, itching, weeping, crusting, and pain.  Patient understands that the inflammatory response to imiquimod is variable from person to person and was educated regarded proper titration schedule.  If flu-like symptoms develop, patient knows to discontinue the medication and contact us.
Zyclara Pregnancy And Lactation Text: This medication is Pregnancy Category C. It is unknown if this medication is excreted in breast milk.
Opzelura Pregnancy And Lactation Text: There is insufficient data to evaluate drug-associated risk for major birth defects, miscarriage, or other adverse maternal or fetal outcomes.  There is a pregnancy registry that monitors pregnancy outcomes in pregnant persons exposed to the medication during pregnancy.  It is unknown if this medication is excreted in breast milk.  Do not breastfeed during treatment and for about 4 weeks after the last dose.
Enbrel Counseling:  I discussed with the patient the risks of etanercept including but not limited to myelosuppression, immunosuppression, autoimmune hepatitis, demyelinating diseases, lymphoma, and infections.  The patient understands that monitoring is required including a PPD at baseline and must alert us or the primary physician if symptoms of infection or other concerning signs are noted.
Erythromycin Counseling:  I discussed with the patient the risks of erythromycin including but not limited to GI upset, allergic reaction, drug rash, diarrhea, increase in liver enzymes, and yeast infections.
Azathioprine Pregnancy And Lactation Text: This medication is Pregnancy Category D and isn't considered safe during pregnancy. It is unknown if this medication is excreted in breast milk.
Dapsone Counseling: I discussed with the patient the risks of dapsone including but not limited to hemolytic anemia, agranulocytosis, rashes, methemoglobinemia, kidney failure, peripheral neuropathy, headaches, GI upset, and liver toxicity.  Patients who start dapsone require monitoring including baseline LFTs and weekly CBCs for the first month, then every month thereafter.  The patient verbalized understanding of the proper use and possible adverse effects of dapsone.  All of the patient's questions and concerns were addressed.
Solaraze Pregnancy And Lactation Text: This medication is Pregnancy Category B and is considered safe. There is some data to suggest avoiding during the third trimester. It is unknown if this medication is excreted in breast milk.
High Dose Vitamin A Counseling: Side effects reviewed, pt to contact office should one occur.
Picato Counseling:  I discussed with the patient the risks of Picato including but not limited to erythema, scaling, itching, weeping, crusting, and pain.
Erythromycin Pregnancy And Lactation Text: This medication is Pregnancy Category B and is considered safe during pregnancy. It is also excreted in breast milk.
Azithromycin Pregnancy And Lactation Text: This medication is considered safe during pregnancy and is also secreted in breast milk.
Siliq Counseling:  I discussed with the patient the risks of Siliq including but not limited to new or worsening depression, suicidal thoughts and behavior, immunosuppression, malignancy, posterior leukoencephalopathy syndrome, and serious infections.  The patient understands that monitoring is required including a PPD at baseline and must alert us or the primary physician if symptoms of infection or other concerning signs are noted. There is also a special program designed to monitor depression which is required with Siliq.
Carac Counseling:  I discussed with the patient the risks of Carac including but not limited to erythema, scaling, itching, weeping, crusting, and pain.
Prednisone Counseling:  I discussed with the patient the risks of prolonged use of prednisone including but not limited to weight gain, insomnia, osteoporosis, mood changes, diabetes, susceptibility to infection, glaucoma and high blood pressure.  In cases where prednisone use is prolonged, patients should be monitored with blood pressure checks, serum glucose levels and an eye exam.  Additionally, the patient may need to be placed on GI prophylaxis, PCP prophylaxis, and calcium and vitamin D supplementation and/or a bisphosphonate.  The patient verbalized understanding of the proper use and the possible adverse effects of prednisone.  All of the patient's questions and concerns were addressed.
Sotyktu Pregnancy And Lactation Text: There is insufficient data to evaluate whether or not Sotyktu is safe to use during pregnancy.   It is not known if Sotyktu passes into breast milk and whether or not it is safe to use when breastfeeding.  
Dutasteride Male Counseling: Dustasteride Counseling:  I discussed with the patient the risks of use of dutasteride including but not limited to decreased libido, decreased ejaculate volume, and gynecomastia. Women who can become pregnant should not handle medication.  All of the patient's questions and concerns were addressed.
Topical Sulfur Applications Pregnancy And Lactation Text: This medication is considered safe during pregnancy and breast feeding secondary to limited systemic absorption.
Bactrim Counseling:  I discussed with the patient the risks of sulfa antibiotics including but not limited to GI upset, allergic reaction, drug rash, diarrhea, dizziness, photosensitivity, and yeast infections.  Rarely, more serious reactions can occur including but not limited to aplastic anemia, agranulocytosis, methemoglobinemia, blood dyscrasias, liver or kidney failure, lung infiltrates or desquamative/blistering drug rashes.
Griseofulvin Pregnancy And Lactation Text: This medication is Pregnancy Category X and is known to cause serious birth defects. It is unknown if this medication is excreted in breast milk but breast feeding should be avoided.
Valtrex Pregnancy And Lactation Text: this medication is Pregnancy Category B and is considered safe during pregnancy. This medication is not directly found in breast milk but it's metabolite acyclovir is present.
Doxepin Counseling:  Patient advised that the medication is sedating and not to drive a car after taking this medication. Patient informed of potential adverse effects including but not limited to dry mouth, urinary retention, and blurry vision.  The patient verbalized understanding of the proper use and possible adverse effects of doxepin.  All of the patient's questions and concerns were addressed.
Xeljanz Counseling: I discussed with the patient the risks of Xeljanz therapy including increased risk of infection, liver issues, headache, diarrhea, or cold symptoms. Live vaccines should be avoided. They were instructed to call if they have any problems.
Rifampin Counseling: I discussed with the patient the risks of rifampin including but not limited to liver damage, kidney damage, red-orange body fluids, nausea/vomiting and severe allergy.
Niacinamide Counseling: I recommended taking niacin or niacinamide, also know as vitamin B3, twice daily. Recent evidence suggests that taking vitamin B3 (500 mg twice daily) can reduce the risk of actinic keratoses and non-melanoma skin cancers. Side effects of vitamin B3 include flushing and headache.
Tremfya Counseling: I discussed with the patient the risks of guselkumab including but not limited to immunosuppression, serious infections, and drug reactions.  The patient understands that monitoring is required including a PPD at baseline and must alert us or the primary physician if symptoms of infection or other concerning signs are noted.
Adbry Counseling: I discussed with the patient the risks of tralokinumab including but not limited to eye infection and irritation, cold sores, injection site reactions, worsening of asthma, allergic reactions and increased risk of parasitic infection.  Live vaccines should be avoided while taking tralokinumab. The patient understands that monitoring is required and they must alert us or the primary physician if symptoms of infection or other concerning signs are noted.
Elidel Counseling: Patient may experience a mild burning sensation during topical application. Elidel is not approved in children less than 2 years of age. There have been case reports of hematologic and skin malignancies in patients using topical calcineurin inhibitors although causality is questionable.
Dapsone Pregnancy And Lactation Text: This medication is Pregnancy Category C and is not considered safe during pregnancy or breast feeding.
Dutasteride Pregnancy And Lactation Text: This medication is absolutely contraindicated in women, especially during pregnancy and breast feeding. Feminization of male fetuses is possible if taking while pregnant.
Topical Retinoid counseling:  Patient advised to apply a pea-sized amount only at bedtime and wait 30 minutes after washing their face before applying.  If too drying, patient may add a non-comedogenic moisturizer. The patient verbalized understanding of the proper use and possible adverse effects of retinoids.  All of the patient's questions and concerns were addressed.
Cellcept Counseling:  I discussed with the patient the risks of mycophenolate mofetil including but not limited to infection/immunosuppression, GI upset, hypokalemia, hypercholesterolemia, bone marrow suppression, lymphoproliferative disorders, malignancy, GI ulceration/bleed/perforation, colitis, interstitial lung disease, kidney failure, progressive multifocal leukoencephalopathy, and birth defects.  The patient understands that monitoring is required including a baseline creatinine and regular CBC testing. In addition, patient must alert us immediately if symptoms of infection or other concerning signs are noted.
Opioid Counseling: I discussed with the patient the potential side effects of opioids including but not limited to addiction, altered mental status, and depression. I stressed avoiding alcohol, benzodiazepines, muscle relaxants and sleep aids unless specifically okayed by a physician. The patient verbalized understanding of the proper use and possible adverse effects of opioids. All of the patient's questions and concerns were addressed. They were instructed to flush the remaining pills down the toilet if they did not need them for pain.
Propranolol Counseling:  I discussed with the patient the risks of propranolol including but not limited to low heart rate, low blood pressure, low blood sugar, restlessness and increased cold sensitivity. They should call the office if they experience any of these side effects.
Humira Counseling:  I discussed with the patient the risks of adalimumab including but not limited to myelosuppression, immunosuppression, autoimmune hepatitis, demyelinating diseases, lymphoma, and serious infections.  The patient understands that monitoring is required including a PPD at baseline and must alert us or the primary physician if symptoms of infection or other concerning signs are noted.
High Dose Vitamin A Pregnancy And Lactation Text: High dose vitamin A therapy is contraindicated during pregnancy and breast feeding.
Simponi Counseling:  I discussed with the patient the risks of golimumab including but not limited to myelosuppression, immunosuppression, autoimmune hepatitis, demyelinating diseases, lymphoma, and serious infections.  The patient understands that monitoring is required including a PPD at baseline and must alert us or the primary physician if symptoms of infection or other concerning signs are noted.
Opioid Pregnancy And Lactation Text: These medications can lead to premature delivery and should be avoided during pregnancy. These medications are also present in breast milk in small amounts.
Xelkirstinz Pregnancy And Lactation Text: This medication is Pregnancy Category D and is not considered safe during pregnancy.  The risk during breast feeding is also uncertain.
Finasteride Male Counseling: Finasteride Counseling:  I discussed with the patient the risks of use of finasteride including but not limited to decreased libido, decreased ejaculate volume, gynecomastia, and depression. Women should not handle medication.  All of the patient's questions and concerns were addressed.
Niacinamide Pregnancy And Lactation Text: These medications are considered safe during pregnancy.
Propranolol Pregnancy And Lactation Text: This medication is Pregnancy Category C and it isn't known if it is safe during pregnancy. It is excreted in breast milk.
Gabapentin Counseling: I discussed with the patient the risks of gabapentin including but not limited to dizziness, somnolence, fatigue and ataxia.
Metronidazole Counseling:  I discussed with the patient the risks of metronidazole including but not limited to seizures, nausea/vomiting, a metallic taste in the mouth, nausea/vomiting and severe allergy.
Wartpeel Counseling:  I discussed with the patient the risks of Wartpeel including but not limited to erythema, scaling, itching, weeping, crusting, and pain.
Rifampin Pregnancy And Lactation Text: This medication is Pregnancy Category C and it isn't know if it is safe during pregnancy. It is also excreted in breast milk and should not be used if you are breast feeding.
Adbry Pregnancy And Lactation Text: It is unknown if this medication will adversely affect pregnancy or breast feeding.
Doxepin Pregnancy And Lactation Text: This medication is Pregnancy Category C and it isn't known if it is safe during pregnancy. It is also excreted in breast milk and breast feeding isn't recommended.
Bactrim Pregnancy And Lactation Text: This medication is Pregnancy Category D and is known to cause fetal risk.  It is also excreted in breast milk.
Klisyri Counseling:  I discussed with the patient the risks of Klisyri including but not limited to erythema, scaling, itching, weeping, crusting, and pain.
Calcipotriene Counseling: Cantharidin Counseling:  I discussed with the patient the risks of Cantharidin including but not limited to pain, redness, burning, itching, and blistering.
Itraconazole Counseling:  I discussed with the patient the risks of itraconazole including but not limited to liver damage, nausea/vomiting, neuropathy, and severe allergy.  The patient understands that this medication is best absorbed when taken with acidic beverages such as non-diet cola or ginger ale.  The patient understands that monitoring is required including baseline LFTs and repeat LFTs at intervals.  The patient understands that they are to contact us or the primary physician if concerning signs are noted.

## 2022-12-07 ENCOUNTER — OFFICE VISIT (OUTPATIENT)
Dept: MEDICAL GROUP | Facility: PHYSICIAN GROUP | Age: 57
End: 2022-12-07
Payer: COMMERCIAL

## 2022-12-07 VITALS
WEIGHT: 218 LBS | TEMPERATURE: 97.6 F | HEART RATE: 79 BPM | HEIGHT: 67 IN | DIASTOLIC BLOOD PRESSURE: 72 MMHG | SYSTOLIC BLOOD PRESSURE: 104 MMHG | BODY MASS INDEX: 34.21 KG/M2 | OXYGEN SATURATION: 92 % | RESPIRATION RATE: 16 BRPM

## 2022-12-07 DIAGNOSIS — E78.2 MIXED HYPERLIPIDEMIA: ICD-10-CM

## 2022-12-07 DIAGNOSIS — Z00.00 ENCOUNTER FOR WELL ADULT EXAM WITHOUT ABNORMAL FINDINGS: ICD-10-CM

## 2022-12-07 DIAGNOSIS — R06.2 WHEEZING: ICD-10-CM

## 2022-12-07 DIAGNOSIS — Z00.00 ROUTINE HEALTH MAINTENANCE: ICD-10-CM

## 2022-12-07 DIAGNOSIS — Z11.59 NEED FOR HEPATITIS C SCREENING TEST: ICD-10-CM

## 2022-12-07 DIAGNOSIS — E66.09 CLASS 1 OBESITY DUE TO EXCESS CALORIES WITH SERIOUS COMORBIDITY AND BODY MASS INDEX (BMI) OF 34.0 TO 34.9 IN ADULT: ICD-10-CM

## 2022-12-07 DIAGNOSIS — Z12.12 SCREENING FOR COLORECTAL CANCER: ICD-10-CM

## 2022-12-07 DIAGNOSIS — Z12.11 SCREENING FOR COLORECTAL CANCER: ICD-10-CM

## 2022-12-07 PROCEDURE — 99396 PREV VISIT EST AGE 40-64: CPT | Performed by: NURSE PRACTITIONER

## 2022-12-07 RX ORDER — ALBUTEROL SULFATE 90 UG/1
1-2 AEROSOL, METERED RESPIRATORY (INHALATION) EVERY 4 HOURS PRN
Qty: 1 EACH | Refills: 0 | Status: SHIPPED | OUTPATIENT
Start: 2022-12-07 | End: 2023-02-11 | Stop reason: SDUPTHER

## 2022-12-07 ASSESSMENT — FIBROSIS 4 INDEX: FIB4 SCORE: 0.93

## 2022-12-07 NOTE — PROGRESS NOTES
Subjective:     CC:   Chief Complaint   Patient presents with    Annual Exam     HPI:   Елена Julian is a 57 y.o. female who presents for annual exam    Patient has GYN provider: Yes  Last Pap Smear: 2022   Last Mammogram: 22  Last Bone Density Test: N/A  Last Colorectal Cancer Screening: Fit test   Last Tdap: Unknown, declines updating  Received HPV series: Aged out    Exercise: sporadic irregular exercise, <half hour walking weekly  Diet: okay      No LMP recorded. Patient is perimenopausal.  She has utilized hormone replacement therapy.  Reports vaginal dryness  No significant bloating/fluid retention, pelvic pain, or dyspareunia. No abnormal vaginal discharge.   No breast tenderness, mass, nipple discharge or changes in size or contour.    OB History    Para Term  AB Living   1 1 1 0 0 1   SAB IAB Ectopic Molar Multiple Live Births   0 0 0 0 0 1   Obstetric Comments   12lb baby boy, 26.3 inches,  177 staples      She  reports being sexually active and has had partner(s) who are male. She reports using the following method of birth control/protection: Condom.    She  has a past medical history of Arthritis, Delayed emergence from general anesthesia, Head ache, Left hand tendonitis (2020), Mixed hyperlipidemia (8/3/2018), and Right hand pain (2019).    She has no past medical history of Addisons disease ([facility]), Adrenal disorder ([facility]), Allergy, Anemia, Anxiety, Arrhythmia, Asthma, Blood transfusion without reported diagnosis, Cancer ([facility]), Cataract, CHF (congestive heart failure) ([facility]), Clotting disorder ([facility]), COPD (chronic obstructive pulmonary disease) ([facility]), Cushings syndrome ([facility]), Depression, Diabetes ([facility]), Diabetic neuropathy ([facility]), GERD (gastroesophageal reflux disease), Glaucoma, Goiter, Heart attack ([facility]), Heart murmur, HIV (human immunodeficiency virus infection) ([facility]), Hypertension, IBD (inflammatory bowel disease), Kidney disease, Meningitis, Migraine,  Muscle disorder, Osteoporosis, Parathyroid disorder (HCC), Pituitary disease (HCC), Pulmonary emphysema (HCC), Seizure (HCC), Sickle cell disease (HCC), Stroke (HCC), Substance abuse (HCC), Thyroid disease, Tuberculosis, or Urinary tract infection.  She  has a past surgical history that includes lumpectomy (Bilateral); primary c section (12/23/1988); lipoma excision (Left, 2004); and pr neuroplasty & or transpos median nrv carpal kaveh (Right, 2/10/2020).    Family History   Problem Relation Age of Onset    Heart Disease Mother     Lung Disease Mother     No Known Problems Father     Other Sister         Gum disease    Heart Disease Sister     Other Brother         MS    Heart Attack Brother     Lung Disease Brother     Cancer Brother         mouth cancer r/t chewing tobacco    Alcohol/Drug Brother         Yovanny ETOH    Lung Disease Maternal Grandmother     Kidney cancer Maternal Grandmother     Lung Cancer Maternal Grandmother     No Known Problems Paternal Grandmother     No Known Problems Sister      Social History     Tobacco Use    Smoking status: Never    Smokeless tobacco: Never   Vaping Use    Vaping Use: Never used   Substance Use Topics    Alcohol use: Yes     Comment: once a month    Drug use: No       Patient Active Problem List    Diagnosis Date Noted    Mass of hand, right 10/27/2022    Lesion of skin of face 10/27/2022    Dyspareunia in female 07/19/2022    Rheumatoid factor positive 11/14/2021    Positive anti-CCP test 11/14/2021    Hair loss 09/27/2021    Wheezing 11/06/2019    Numbness of fingers 11/06/2019    Class 1 obesity due to excess calories with serious comorbidity and body mass index (BMI) of 34.0 to 34.9 in adult 01/03/2019    Vitamin D deficiency 08/03/2018    Mixed hyperlipidemia 08/03/2018    Postmenopausal 06/28/2018    Primary osteoarthritis involving multiple joints 06/28/2018     Current Outpatient Medications   Medication Sig Dispense Refill    albuterol 108 (90 Base) MCG/ACT Aero  "Soln inhalation aerosol Inhale 1-2 Puffs every four hours as needed for Shortness of Breath. 1 Each 0    estradiol (VAGIFEM) 10 MCG Tab        No current facility-administered medications for this visit.     Allergies   Allergen Reactions    Codeine Vomiting     throat swelling    Fluorouracil Itching     Itching    Aspirin Itching     blisters    Ibuprofen Vomiting     And itching    Influenza Vaccines Hives    Latex Itching     And blisters only if worn- OK to be touched by others    Tylenol Arthritis Pain Rash     Blisters in mouth    Vicodin [Hydrocodone-Acetaminophen] Hives     Sever N/V     Review of Systems   Constitutional: Negative for fever, chills and malaise/fatigue.   HENT: Negative for congestion.    Eyes: Negative for pain.   Respiratory: Negative for cough and shortness of breath.    Cardiovascular: Negative for chest pain and leg swelling.   Gastrointestinal: Negative for nausea, vomiting, abdominal pain and diarrhea.   Genitourinary: Negative for dysuria and hematuria.   Skin: Negative for rash.   Neurological: Negative for dizziness, focal weakness and headaches.   Endo/Heme/Allergies: Does not bruise/bleed easily.   Psychiatric/Behavioral: Negative for depression.  The patient is not nervous/anxious.      Objective:   /72 (BP Location: Left arm, Patient Position: Sitting, BP Cuff Size: Large adult)   Pulse 79   Temp 36.4 °C (97.6 °F) (Temporal)   Resp 16   Ht 1.702 m (5' 7\")   Wt 98.9 kg (218 lb)   SpO2 92%   BMI 34.14 kg/m²     Wt Readings from Last 4 Encounters:   12/07/22 98.9 kg (218 lb)   10/27/22 98 kg (216 lb)   07/19/22 98.9 kg (218 lb)   11/02/21 100 kg (221 lb)     Physical Exam:  Constitutional: Well-developed and well-nourished. Not diaphoretic. No distress.   Skin: Skin is warm and dry. No rash noted.  Head: Atraumatic without lesions.  Eyes: Conjunctivae and extraocular motions are normal. Pupils are equal, round, and reactive to light. No scleral icterus.   Ears:  " External ears unremarkable. Tympanic membranes clear and intact.  Nose: Nares patent. Septum midline. Turbinates without erythema nor edema. No discharge.   Mouth/Throat: Tongue normal. Oropharynx is clear and moist. Posterior pharynx without erythema or exudates.  Neck: Supple, trachea midline. Normal range of motion. No thyromegaly present. No lymphadenopathy--cervical or supraclavicular.  Cardiovascular: Regular rate and rhythm, S1 and S2 without murmur, rubs, or gallops.    Respiratory: Effort normal. Clear to auscultation throughout. No adventitious sounds.   Breast:  Breast exam deferred. Discussed monthly self exams and what to look for, including peau d'orange or nipple retraction, discharge, breasts moving freely and equally without restriction, axillary/supraclavicular adenopathy, or palpable masses/nodules.  Abdomen: Soft, non tender, and without distention. Active bowel sounds in all four quadrants. No rebound, guarding.  Extremities: No cyanosis, clubbing, erythema, nor edema.  Radial pulses intact and symmetric.   Musculoskeletal: All major joints AROM full in all directions without pain.  Neurological: Alert and oriented x 3. Grossly non-focal. Strength and sensation grossly intact.    Psychiatric:  Behavior, mood, and affect are appropriate.    Assessment and Plan:   1. Encounter for well adult exam without abnormal findings    2. Mixed hyperlipidemia  3. Class 1 obesity due to excess calories with serious comorbidity and body mass index (BMI) of 34.0 to 34.9 in adult  Chronic, improving without medication.  Due for annual labs in December 2023.  Encourage diet high in fruits, vegetables, and fiber. And a diet low in salt, refined carbohydrates, cholesterol, saturated fat, and trans fatty acids.    Encourage a minimum of 30 minutes of moderate intensity aerobic exercise (eg, brisk walking) is recommended on five days each week. Or 30 minutes of vigorous-intensity aerobic exercise (eg, jogging) on  three days each week.   Patient's body mass index is 34.14 kg/m². Exercise and nutrition counseling were performed at this visit.  - CBC WITH DIFFERENTIAL; Future  - Comp Metabolic Panel; Future  - Lipid Profile; Future  - TSH WITH REFLEX TO FT4; Future    4. Wheezing  Chronic, ongoing.  Continue albuterol inhaler 1-2 puffs every 4 hours as needed for shortness of breath or wheezing, refill sent to pharmacy.  Due for annual labs in December 2023.  - albuterol 108 (90 Base) MCG/ACT Aero Soln inhalation aerosol; Inhale 1-2 Puffs every four hours as needed for Shortness of Breath.  Dispense: 1 Each; Refill: 0  - CBC WITH DIFFERENTIAL; Future    5. Screening for colorectal cancer  Due for screening.  - COLOGUARD (FIT DNA)    6. Routine health maintenance  Due for annual labs in December 2023 prior to annual follow-up.  - CBC WITH DIFFERENTIAL; Future  - Comp Metabolic Panel; Future  - Lipid Profile; Future  - TSH WITH REFLEX TO FT4; Future  - HEP C VIRUS ANTIBODY; Future    7. Need for hepatitis C screening test  Due for one-time screening.  - HEP C VIRUS ANTIBODY; Future     Health maintenance: Up-to-date  Labs per orders  Immunizations: Declines  Patient counseled about skin care, diet, supplements, and exercise.  Discussed  breast self exam, mammography screening, STD prevention, use and side effects of HRT, menopause, osteoporosis, adequate intake of calcium and vitamin D, diet and exercise, colorectal cancer screening     Follow-up: Return in about 1 year (around 12/7/2023) for Preventative Annual, Follow up Labs, As needed.     Please note that this dictation was created using voice recognition software. I have worked with consultants from the vendor as well as technical experts from [employer] to optimize the interface. I have made every reasonable attempt to correct obvious errors, but I expect that there are errors of grammar and possibly content that I did not discover before finalizing the note.

## 2023-01-01 NOTE — PROCEDURES
"NERVE CONDUCTION STUDIES AND ELECTROMYOGRAPHY REPORT  [facility]  01/03/2020           IMPRESSION:  This is an abnormal electrodiagnostic study due to evidence of right median neuropathy of uncertain location (suspect at wrist) with no appreciable motor units in the right abductor pollicis brevis. There is no evidence of right ulnar neuropathy or right cervical radiculopathy. Recommend clinical correlation and appropriate imaging study for localization/evaluation of mass effect if clinically appropriate.       Radha Erwin MD  Neurology - Neurophysiology  [facility]      REASON FOR REFERRAL:  Ms. Елена Julian 54 y.o. referred by Natalya CHANDLER for evaluation of numbness in the right hand, specifically affecting the first 3 fingers for about 6-8 months without precipitating event.  This is associated with zinging pains in her wrists.  Focused neurological exam demonstrates absent pinprick on the first right three fingers and inability to abduct the right thumb.     Height: 5'7\"  Weight: 207 lbs      ELECTRODIAGNOSTIC EXAMINATION:  Nerve conduction studies (NCS) and electromyography (EMG) are utilized to evaluate direct or indirect damage to the peripheral nervous system. NCS are performed to measure the nerve(s) response(s) to electrostimulation across a given nerve segment. EMG evaluates the passive and active electrical activity of the muscle(s) in question.  Muscles are innervated by specific peripheral nerves and roots. Often times, several nerves the muscle to be examined in order to determine the presence or absence of the disease process. Furthermore, nerves and muscles may need to be tested in a [id number] comparison, as well as in additional extremities, as this may be crucial in characterizing the extent of the disease process, which may be diffuse or isolated and of varying degree of severity. The extent of the neurodiagnostic exam is justified as it may help " Assessment: Continue to discharge planning.     DISCHARGE SCREENS:   ONBS: 2023 All within range  Hearing Screen: 10/25 passed  Immunizations: #### Parents request to administer outpatient at PMD appointment.  Tricia challenge: ####  CCHD: ####  Infant CPR: ####  Home going class: ####  Repeat thyroid studies: 10/10 TFTs: TSH: 0.63 (WNL), Free T4: 0.97, Free T4 DD: never resulted.  (Repeat TFTS at 6-8 weeks per protocol), due 11/21  PMD: Dr. Gomez; [facility]     11/11 Discussed Nirsevimab  (Synagis) with mom; she will discuss with FOB and get back to us.    arrive to a proper diagnosis, which ultimately may contribute to better management of the patient. Therefore, the nerves to muscles examined during the study were medically necessary.    Unless otherwise noted, temperature of the extremity(s) study was monitored before and during the examination and remained between 32 and 36 degrees C for the upper extremities, and between 30 and 36 degrees C for the lower extremities. The patient tolerated testing well, without any complications.       NERVE CONDUCTION STUDY SUMMARY:  Selected nerves of the right upper extremity are studied.    Unobtainable right median sensory and motor responses.  Normal right ulnar sensory and motor responses.    NEEDLE EMG SUMMARY:  Concentric needle study of selected right upper extremity muscles is performed.     There is decreased insertion activity in the right abductor pollicis brevis.  Otherwise, insertion is normal in remaining muscles sampled.   No motor unit potentials are appreciated in the abductor pollicis brevis.  Otherwise with activation, there are normal morphology (amplitude/duration) motor unit action potentials firing with normal recruitment in remaining muscles tested.       PATIENT DATA TABLES  Nerve Conduction Studies     Stim Site NR Onset (ms) Norm Onset (ms) O-P Amp (µV) Norm O-P Amp Site1 Site2 Delta-P (ms) Dist (cm) Endy (m/s) Norm Endy (m/s)   Right Median Anti Sensory (2nd Digit)   Wrist *NR  <3.6  >10 Wrist 2nd Digit  14.0  >50        Stim Site NR Onset (ms) Norm Onset (ms) O-P Amp (mV) Norm O-P Amp Site1 Site2 Delta-0 (ms) Dist (cm) Endy (m/s) Norm Endy (m/s)   Right Median Motor (Abd Poll Brev)   Wrist *NR  <4  >6 Elbow Wrist  0.0  >50   Elbow *NR             Right Ulnar Motor (Abd Dig Min)   Wrist    2.7 <3.1 8.3 >7 B Elbow Wrist 3.3 18.0 55 >50   B Elbow    6.0  7.8  A Elbow B Elbow 1.2 10.0 83    A Elbow    7.2  7.5               Stim Site NR Peak (ms) Norm Peak (ms) P-T Amp (µV) Site1 Site2 Delta-P (ms) Norm  Delta (ms)   Right Median/Ulnar Palm Comparison (Wrist - 8cm)  34.9°C   Median Palm *NR  <2.3  Median Palm Ulnar Palm  <0.3   Ulnar Palm    1.8 <2.3 12.1                     Electromyography     Side Muscle Nerve Root Ins Act Fibs Psw Amp Dur Poly Recrt Int Pat Comment   Right 1stDorInt Ulnar C8-T1 Nml Nml Nml Nml Nml 0 Nml Nml    Right PronatorTeres Median C6-7 Nml Nml Nml Nml Nml 0 Nml Nml    Right Biceps Musculocut C5-6 Nml Nml Nml Nml Nml 0 Nml Nml    Right Triceps Radial C6-7-8 Nml Nml Nml Nml Nml 0 Nml Nml    Right Deltoid Axillary C5-6 Nml Nml Nml Nml Nml 0 Nml Nml    Right Abd Poll Brev Median C8-T1 Decr Nml Nml      No MUPs

## 2023-02-11 DIAGNOSIS — R06.2 WHEEZING: ICD-10-CM

## 2023-02-13 RX ORDER — ALBUTEROL SULFATE 90 UG/1
1-2 AEROSOL, METERED RESPIRATORY (INHALATION) EVERY 4 HOURS PRN
Qty: 1 EACH | Refills: 3 | Status: SHIPPED | OUTPATIENT
Start: 2023-02-13 | End: 2023-05-13 | Stop reason: SDUPTHER

## 2023-02-13 NOTE — TELEPHONE ENCOUNTER
Received request via: Patient    Was the patient seen in the last year in this department? Yes    Does the patient have an active prescription (recently filled or refills available) for medication(s) requested? No    Does the patient have nursing home Plus and need 100 day supply (blood pressure, diabetes and cholesterol meds only)? Medication is not for cholesterol, blood pressure or diabetes

## 2023-02-13 NOTE — TELEPHONE ENCOUNTER
Requested Prescriptions     Pending Prescriptions Disp Refills   • albuterol 108 (90 Base) MCG/ACT Aero Soln inhalation aerosol 1 Each 3     Sig: Inhale 1-2 Puffs every four hours as needed for Shortness of Breath.       SALVADOR Franklin.

## 2023-05-13 DIAGNOSIS — R06.2 WHEEZING: ICD-10-CM

## 2023-05-14 RX ORDER — ALBUTEROL SULFATE 90 UG/1
1-2 AEROSOL, METERED RESPIRATORY (INHALATION) EVERY 4 HOURS PRN
Qty: 1 EACH | Refills: 3 | Status: SHIPPED | OUTPATIENT
Start: 2023-05-14 | End: 2023-08-30 | Stop reason: SDUPTHER

## 2023-07-06 ENCOUNTER — HOSPITAL ENCOUNTER (OUTPATIENT)
Dept: RADIOLOGY | Facility: MEDICAL CENTER | Age: 58
End: 2023-07-06
Attending: NURSE PRACTITIONER
Payer: COMMERCIAL

## 2023-07-06 ENCOUNTER — OFFICE VISIT (OUTPATIENT)
Dept: URGENT CARE | Facility: PHYSICIAN GROUP | Age: 58
End: 2023-07-06
Payer: COMMERCIAL

## 2023-07-06 VITALS
TEMPERATURE: 99.2 F | HEIGHT: 67 IN | HEART RATE: 113 BPM | WEIGHT: 210 LBS | RESPIRATION RATE: 18 BRPM | OXYGEN SATURATION: 97 % | BODY MASS INDEX: 32.96 KG/M2 | SYSTOLIC BLOOD PRESSURE: 110 MMHG | DIASTOLIC BLOOD PRESSURE: 64 MMHG

## 2023-07-06 DIAGNOSIS — R05.1 ACUTE COUGH: ICD-10-CM

## 2023-07-06 DIAGNOSIS — R06.2 WHEEZING: ICD-10-CM

## 2023-07-06 DIAGNOSIS — R06.02 SOB (SHORTNESS OF BREATH): ICD-10-CM

## 2023-07-06 DIAGNOSIS — J22 ACUTE RESPIRATORY INFECTION: ICD-10-CM

## 2023-07-06 PROCEDURE — 3074F SYST BP LT 130 MM HG: CPT | Performed by: NURSE PRACTITIONER

## 2023-07-06 PROCEDURE — 71046 X-RAY EXAM CHEST 2 VIEWS: CPT

## 2023-07-06 PROCEDURE — 3078F DIAST BP <80 MM HG: CPT | Performed by: NURSE PRACTITIONER

## 2023-07-06 PROCEDURE — 94640 AIRWAY INHALATION TREATMENT: CPT | Performed by: NURSE PRACTITIONER

## 2023-07-06 PROCEDURE — 99214 OFFICE O/P EST MOD 30 MIN: CPT | Mod: 25 | Performed by: NURSE PRACTITIONER

## 2023-07-06 RX ORDER — DOXYCYCLINE HYCLATE 100 MG
100 TABLET ORAL 2 TIMES DAILY
Qty: 20 TABLET | Refills: 0 | Status: SHIPPED | OUTPATIENT
Start: 2023-07-06 | End: 2023-07-16

## 2023-07-06 RX ORDER — BUDESONIDE AND FORMOTEROL FUMARATE DIHYDRATE 160; 4.5 UG/1; UG/1
1 AEROSOL RESPIRATORY (INHALATION) DAILY
Qty: 1 EACH | Refills: 0 | Status: SHIPPED | OUTPATIENT
Start: 2023-07-06 | End: 2023-12-07

## 2023-07-06 RX ORDER — IPRATROPIUM BROMIDE AND ALBUTEROL SULFATE 2.5; .5 MG/3ML; MG/3ML
3 SOLUTION RESPIRATORY (INHALATION) ONCE
Status: COMPLETED | OUTPATIENT
Start: 2023-07-06 | End: 2023-07-06

## 2023-07-06 RX ADMIN — IPRATROPIUM BROMIDE AND ALBUTEROL SULFATE 3 ML: 2.5; .5 SOLUTION RESPIRATORY (INHALATION) at 18:17

## 2023-07-06 ASSESSMENT — FIBROSIS 4 INDEX: FIB4 SCORE: 0.94

## 2023-07-07 NOTE — PROGRESS NOTES
Елена Julian is a 58 y.o. female who presents for Cough (X 2 month, Chest congestion started yesterday, sob, wheezing )      HPI  This is a new problem. Елена Julian is a 58 y.o. patient who presents to urgent care with c/o: Coughing for 2 months.  Much worse over the past week.  She has congestion and thick green dark brown phlegm that is worse in the morning.  She is developed shortness of breath.  She finds that she has dyspnea with even light exertion.  She has been wheezing.  She said last night her son was afraid that she was at a stop breathing when he heard her breathe.  She says she can audibly hear herself making wheezing noises.  She was given an albuterol inhaler from her primary care provider.  She has tried it a few times and has not noticed significant relief in her symptoms.  She does not like to take medicine so has not tried anything else.  She is a non-smoker.  She has no underlying history of asthma or COPD.  No other aggravating or alleviating factors.    Patient reports a history to steroids.  She says she ended up in the hospital after using steroids at a large dose.  She has been reluctant to use steroids since that time.  They told her she might be allergic to steroids.        ROS See HPI    Allergies:       Allergies   Allergen Reactions    Codeine Vomiting     throat swelling    Fluorouracil Itching     Itching    Aspirin Itching     blisters    Ibuprofen Vomiting     And itching    Influenza Vaccines Hives    Latex Itching     And blisters only if worn- OK to be touched by others    Other Drug Shortness of Breath     Allergic to steroids - she was hospitalized in [place] after having steroid medications.     Tylenol Arthritis Pain Rash     Blisters in mouth    Vicodin [Hydrocodone-Acetaminophen] Hives     Sever N/V       PMSFS Hx:  Past Medical History:   Diagnosis Date    Arthritis     Delayed emergence from general anesthesia     Head ache     post     Left hand tendonitis  02/2020    Mixed hyperlipidemia 8/3/2018    Right hand pain 02/2019     Past Surgical History:   Procedure Laterality Date    [facility] NEUROPLASTY & OR TRANSPOS MEDIAN NRV CARPAL TASH Right 2/10/2020    Procedure: RELEASE, CARPAL TUNNEL;  Surgeon: Georgina Steinberg M.D.;  Location: SURGERY [facility];  Service: Orthopedics    LIPOMA EXCISION Left 2004    wrist    PRIMARY C SECTION  12/23/1988    LUMPECTOMY Bilateral      Family History   Problem Relation Age of Onset    Heart Disease Mother     Lung Disease Mother     No Known Problems Father     Other Sister         Gum disease    Heart Disease Sister     Other Brother         MS    Heart Attack Brother     Lung Disease Brother     Cancer Brother         mouth cancer r/t chewing tobacco    Alcohol/Drug Brother         Yovanny ETOH    Lung Disease Maternal Grandmother     Kidney cancer Maternal Grandmother     Lung Cancer Maternal Grandmother     No Known Problems Paternal Grandmother     No Known Problems Sister      Social History     Tobacco Use    Smoking status: Never    Smokeless tobacco: Never   Vaping Use    Vaping Use: Never used   Substance Use Topics    Alcohol use: Yes     Comment: once a month       Problems:   Patient Active Problem List   Diagnosis    Postmenopausal    Primary osteoarthritis involving multiple joints    Vitamin D deficiency    Mixed hyperlipidemia    Class 1 obesity due to excess calories with serious comorbidity and body mass index (BMI) of 34.0 to 34.9 in adult    Wheezing    Numbness of fingers    Hair loss    Rheumatoid factor positive    Positive anti-CCP test    Dyspareunia in female    Mass of hand, right    Lesion of skin of face       Medications:   Current Outpatient Medications on File Prior to Visit   Medication Sig Dispense Refill    albuterol 108 (90 Base) MCG/ACT Aero Soln inhalation aerosol Inhale 1-2 Puffs every four hours as needed for Shortness of Breath. 1 Each 3    estradiol (VAGIFEM) 10 MCG Tab        No current  "facility-administered medications on file prior to visit.          Objective:     /64   Pulse (!) 113   Temp 37.3 °C (99.2 °F) (Temporal)   Resp 18   Ht 1.702 m (5' 7\")   Wt 95.3 kg (210 lb)   SpO2 97%   BMI 32.89 kg/m²     Physical Exam  Vitals and nursing note reviewed.   Constitutional:       General: She is not in acute distress.     Appearance: Normal appearance. She is well-developed. She is not ill-appearing or toxic-appearing.   HENT:      Head: Normocephalic.      Right Ear: Hearing, ear canal and external ear normal. No middle ear effusion. Tympanic membrane is injected. Tympanic membrane is not erythematous.      Left Ear: Hearing, ear canal and external ear normal.  No middle ear effusion. Tympanic membrane is injected. Tympanic membrane is not erythematous.      Nose: Nose normal. No mucosal edema or rhinorrhea.      Right Sinus: No maxillary sinus tenderness or frontal sinus tenderness.      Left Sinus: No maxillary sinus tenderness or frontal sinus tenderness.      Mouth/Throat:      Mouth: Mucous membranes are moist.      Pharynx: Uvula midline. No posterior oropharyngeal erythema.      Tonsils: No tonsillar abscesses.   Eyes:      Conjunctiva/sclera: Conjunctivae normal.   Neck:      Trachea: Trachea normal.   Cardiovascular:      Rate and Rhythm: Normal rate and regular rhythm.      Chest Wall: PMI is not displaced.      Pulses: Normal pulses.      Heart sounds: Normal heart sounds.   Pulmonary:      Effort: Pulmonary effort is normal. No respiratory distress.      Breath sounds: Wheezing and rhonchi (Scattered) present. No decreased breath sounds.   Musculoskeletal:         General: Normal range of motion.      Cervical back: Full passive range of motion without pain, normal range of motion and neck supple.   Lymphadenopathy:      Cervical: No cervical adenopathy.      Upper Body:      Right upper body: No supraclavicular adenopathy.      Left upper body: No supraclavicular " adenopathy.   Skin:     General: Skin is warm and dry.      Capillary Refill: Capillary refill takes less than 2 seconds.   Neurological:      Mental Status: She is alert and oriented to person, place, and time.      Gait: Gait normal.   Psychiatric:         Mood and Affect: Mood normal.         Speech: Speech normal.         Behavior: Behavior normal. Behavior is cooperative.         Thought Content: Thought content normal.         Judgment: Judgment normal.       RADIOLOGY RESULTS   DX-CHEST-2 VIEWS    Result Date: 7/6/2023 7/6/2023 5:18 PM HISTORY/REASON FOR EXAM:  Shortness of breath, chest pain, wheezing TECHNIQUE/EXAM DESCRIPTION AND NUMBER OF VIEWS: Two views of the chest. COMPARISON:  11/6/2019. FINDINGS: LUNGS: Clear. No effusions. PNEUMOTHORAX: None. LINES AND TUBES: None. MEDIASTINUM: No cardiomegaly. MUSCULOSKELETAL STRUCTURES: No acute displaced fracture.     No acute cardiopulmonary abnormality.         Xray: Reviewed and interpreted independently by me. I agree with the radiologist's findings.       Consult with patient's PCP SALVADOR Franklin regarding her thoughts on using a inhaled steroid.  Natalya says that she has talked to the patient about that in the past few weeks.  It was agreed to try her on a low-dose inhaled steroid and have her follow-up in primary care at the soonest available appointment.      Assessment /Associated Orders:      1. Wheezing  ipratropium-albuterol (DUONEB) nebulizer solution    DX-CHEST-2 VIEWS    budesonide-formoterol (SYMBICORT) 160-4.5 MCG/ACT Aerosol      2. SOB (shortness of breath)  ipratropium-albuterol (DUONEB) nebulizer solution    DX-CHEST-2 VIEWS    budesonide-formoterol (SYMBICORT) 160-4.5 MCG/ACT Aerosol      3. Acute cough  DX-CHEST-2 VIEWS      4. Acute respiratory infection  doxycycline (VIBRAMYCIN) 100 MG Tab          Medical Decision Making:    Pt is clinically stable at today's acute urgent care visit.  No acute distress noted. Appropriate  for outpatient care at this time.   Acute problem today with uncertain prognosis.   Educated in proper administration of  prescription medication(s) ordered today including safety, possible SE, risks, benefits, rationale and alternatives to therapy.   Educated to wash her mouth out after using inhaled steroids to prevent thrush.  Patient verbalizes understanding.  Resume all prior  RX medications. Take as prescribed.   Keep well hydrated    Discussed Dx, management options (risks,benefits, and alternatives to planned treatment), natural progression and supportive care.  Expressed understanding and the treatment plan was agreed upon.   Questions were encouraged and answered   Return to urgent care prn if new or worsening sx or if there is no improvement in condition prn.    Educated in Red flags and indications to immediately call 911 or present to the Emergency Department.       Time I spent evaluating Елена Julian in urgent care today was 32  minutes. This time includes preparing for visit, reviewing any pertinent notes or test results, counseling/education, exam, obtaining HPI, interpretation of lab tests, medication management and documentation as indicated above.Time does not include separately billable procedures noted .       Please note that this dictation was created using voice recognition software. I have worked with consultants from the vendor as well as technical experts from [facility] to optimize the interface. I have made every reasonable attempt to correct obvious errors, but I expect that there are errors of grammar and possibly content that I did not discover before finalizing the note.  This note was electronically signed by provider

## 2023-07-22 ENCOUNTER — TELEPHONE (OUTPATIENT)
Dept: URGENT CARE | Facility: PHYSICIAN GROUP | Age: 58
End: 2023-07-22
Payer: COMMERCIAL

## 2023-08-30 DIAGNOSIS — R06.2 WHEEZING: ICD-10-CM

## 2023-09-03 RX ORDER — ALBUTEROL SULFATE 90 UG/1
1-2 AEROSOL, METERED RESPIRATORY (INHALATION) EVERY 6 HOURS PRN
Qty: 1 EACH | Refills: 0 | Status: SHIPPED | OUTPATIENT
Start: 2023-09-03 | End: 2023-12-02 | Stop reason: SDUPTHER

## 2023-09-03 NOTE — TELEPHONE ENCOUNTER
Requested Prescriptions     Pending Prescriptions Disp Refills   • albuterol 108 (90 Base) MCG/ACT Aero Soln inhalation aerosol 1 Each 0     Sig: Inhale 1-2 Puffs every 6 hours as needed for Shortness of Breath.       SALVADOR Franklin.

## 2023-12-02 DIAGNOSIS — R06.2 WHEEZING: ICD-10-CM

## 2023-12-04 RX ORDER — ALBUTEROL SULFATE 90 UG/1
1-2 AEROSOL, METERED RESPIRATORY (INHALATION) EVERY 6 HOURS PRN
Qty: 3 EACH | Refills: 3 | Status: SHIPPED | OUTPATIENT
Start: 2023-12-04 | End: 2024-01-28 | Stop reason: SDUPTHER

## 2023-12-04 SDOH — ECONOMIC STABILITY: INCOME INSECURITY: HOW HARD IS IT FOR YOU TO PAY FOR THE VERY BASICS LIKE FOOD, HOUSING, MEDICAL CARE, AND HEATING?: NOT VERY HARD

## 2023-12-04 SDOH — HEALTH STABILITY: PHYSICAL HEALTH: ON AVERAGE, HOW MANY DAYS PER WEEK DO YOU ENGAGE IN MODERATE TO STRENUOUS EXERCISE (LIKE A BRISK WALK)?: 5 DAYS

## 2023-12-04 SDOH — ECONOMIC STABILITY: FOOD INSECURITY: WITHIN THE PAST 12 MONTHS, THE FOOD YOU BOUGHT JUST DIDN'T LAST AND YOU DIDN'T HAVE MONEY TO GET MORE.: NEVER TRUE

## 2023-12-04 SDOH — ECONOMIC STABILITY: HOUSING INSECURITY: IN THE LAST 12 MONTHS, HOW MANY PLACES HAVE YOU LIVED?: 1

## 2023-12-04 SDOH — ECONOMIC STABILITY: FOOD INSECURITY: WITHIN THE PAST 12 MONTHS, YOU WORRIED THAT YOUR FOOD WOULD RUN OUT BEFORE YOU GOT MONEY TO BUY MORE.: NEVER TRUE

## 2023-12-04 SDOH — ECONOMIC STABILITY: TRANSPORTATION INSECURITY
IN THE PAST 12 MONTHS, HAS THE LACK OF TRANSPORTATION KEPT YOU FROM MEDICAL APPOINTMENTS OR FROM GETTING MEDICATIONS?: NO

## 2023-12-04 SDOH — ECONOMIC STABILITY: HOUSING INSECURITY
IN THE LAST 12 MONTHS, WAS THERE A TIME WHEN YOU DID NOT HAVE A STEADY PLACE TO SLEEP OR SLEPT IN A SHELTER (INCLUDING NOW)?: NO

## 2023-12-04 SDOH — HEALTH STABILITY: PHYSICAL HEALTH: ON AVERAGE, HOW MANY MINUTES DO YOU ENGAGE IN EXERCISE AT THIS LEVEL?: PATIENT DECLINED

## 2023-12-04 SDOH — ECONOMIC STABILITY: TRANSPORTATION INSECURITY
IN THE PAST 12 MONTHS, HAS LACK OF TRANSPORTATION KEPT YOU FROM MEETINGS, WORK, OR FROM GETTING THINGS NEEDED FOR DAILY LIVING?: NO

## 2023-12-04 SDOH — ECONOMIC STABILITY: INCOME INSECURITY: IN THE LAST 12 MONTHS, WAS THERE A TIME WHEN YOU WERE NOT ABLE TO PAY THE MORTGAGE OR RENT ON TIME?: NO

## 2023-12-04 SDOH — ECONOMIC STABILITY: TRANSPORTATION INSECURITY
IN THE PAST 12 MONTHS, HAS LACK OF RELIABLE TRANSPORTATION KEPT YOU FROM MEDICAL APPOINTMENTS, MEETINGS, WORK OR FROM GETTING THINGS NEEDED FOR DAILY LIVING?: NO

## 2023-12-04 SDOH — HEALTH STABILITY: MENTAL HEALTH
STRESS IS WHEN SOMEONE FEELS TENSE, NERVOUS, ANXIOUS, OR CAN'T SLEEP AT NIGHT BECAUSE THEIR MIND IS TROUBLED. HOW STRESSED ARE YOU?: TO SOME EXTENT

## 2023-12-04 ASSESSMENT — SOCIAL DETERMINANTS OF HEALTH (SDOH)
IN A TYPICAL WEEK, HOW MANY TIMES DO YOU TALK ON THE PHONE WITH FAMILY, FRIENDS, OR NEIGHBORS?: MORE THAN THREE TIMES A WEEK
HOW OFTEN DO YOU HAVE SIX OR MORE DRINKS ON ONE OCCASION: NEVER
HOW MANY DRINKS CONTAINING ALCOHOL DO YOU HAVE ON A TYPICAL DAY WHEN YOU ARE DRINKING: PATIENT DOES NOT DRINK
HOW OFTEN DO YOU HAVE A DRINK CONTAINING ALCOHOL: MONTHLY OR LESS
HOW OFTEN DO YOU GET TOGETHER WITH FRIENDS OR RELATIVES?: ONCE A WEEK
DO YOU BELONG TO ANY CLUBS OR ORGANIZATIONS SUCH AS CHURCH GROUPS UNIONS, FRATERNAL OR ATHLETIC GROUPS, OR SCHOOL GROUPS?: NO
HOW OFTEN DO YOU ATTENT MEETINGS OF THE CLUB OR ORGANIZATION YOU BELONG TO?: NEVER
HOW OFTEN DO YOU GET TOGETHER WITH FRIENDS OR RELATIVES?: ONCE A WEEK
HOW OFTEN DO YOU ATTENT MEETINGS OF THE CLUB OR ORGANIZATION YOU BELONG TO?: NEVER
HOW OFTEN DO YOU ATTEND CHURCH OR RELIGIOUS SERVICES?: NEVER
IN A TYPICAL WEEK, HOW MANY TIMES DO YOU TALK ON THE PHONE WITH FAMILY, FRIENDS, OR NEIGHBORS?: MORE THAN THREE TIMES A WEEK
HOW OFTEN DO YOU ATTEND CHURCH OR RELIGIOUS SERVICES?: NEVER
WITHIN THE PAST 12 MONTHS, YOU WORRIED THAT YOUR FOOD WOULD RUN OUT BEFORE YOU GOT THE MONEY TO BUY MORE: NEVER TRUE
HOW HARD IS IT FOR YOU TO PAY FOR THE VERY BASICS LIKE FOOD, HOUSING, MEDICAL CARE, AND HEATING?: NOT VERY HARD
DO YOU BELONG TO ANY CLUBS OR ORGANIZATIONS SUCH AS CHURCH GROUPS UNIONS, FRATERNAL OR ATHLETIC GROUPS, OR SCHOOL GROUPS?: NO

## 2023-12-04 ASSESSMENT — LIFESTYLE VARIABLES
HOW MANY STANDARD DRINKS CONTAINING ALCOHOL DO YOU HAVE ON A TYPICAL DAY: PATIENT DOES NOT DRINK
HOW OFTEN DO YOU HAVE A DRINK CONTAINING ALCOHOL: MONTHLY OR LESS
HOW OFTEN DO YOU HAVE SIX OR MORE DRINKS ON ONE OCCASION: NEVER
AUDIT-C TOTAL SCORE: 1
SKIP TO QUESTIONS 9-10: 1

## 2023-12-04 NOTE — TELEPHONE ENCOUNTER
Requested Prescriptions     Pending Prescriptions Disp Refills    albuterol 108 (90 Base) MCG/ACT Aero Soln inhalation aerosol 3 Each 3     Sig: Inhale 1-2 Puffs every 6 hours as needed for Shortness of Breath.       SALVADOR Franklin.

## 2023-12-07 ENCOUNTER — RESEARCH ENCOUNTER (OUTPATIENT)
Dept: RESEARCH | Facility: MEDICAL CENTER | Age: 58
End: 2023-12-07

## 2023-12-07 ENCOUNTER — OFFICE VISIT (OUTPATIENT)
Dept: MEDICAL GROUP | Facility: PHYSICIAN GROUP | Age: 58
End: 2023-12-07
Payer: COMMERCIAL

## 2023-12-07 ENCOUNTER — HOSPITAL ENCOUNTER (OUTPATIENT)
Dept: LAB | Facility: MEDICAL CENTER | Age: 58
End: 2023-12-07
Attending: NURSE PRACTITIONER
Payer: COMMERCIAL

## 2023-12-07 VITALS
TEMPERATURE: 98 F | WEIGHT: 215 LBS | RESPIRATION RATE: 16 BRPM | HEIGHT: 67 IN | BODY MASS INDEX: 33.74 KG/M2 | SYSTOLIC BLOOD PRESSURE: 118 MMHG | OXYGEN SATURATION: 92 % | DIASTOLIC BLOOD PRESSURE: 70 MMHG | HEART RATE: 80 BPM

## 2023-12-07 DIAGNOSIS — Z12.31 ENCOUNTER FOR SCREENING MAMMOGRAM FOR BREAST CANCER: ICD-10-CM

## 2023-12-07 DIAGNOSIS — E55.9 VITAMIN D DEFICIENCY: ICD-10-CM

## 2023-12-07 DIAGNOSIS — Z12.11 SCREENING FOR COLORECTAL CANCER: ICD-10-CM

## 2023-12-07 DIAGNOSIS — R06.2 WHEEZING: ICD-10-CM

## 2023-12-07 DIAGNOSIS — R06.02 SHORTNESS OF BREATH: ICD-10-CM

## 2023-12-07 DIAGNOSIS — E66.09 CLASS 1 OBESITY DUE TO EXCESS CALORIES WITH SERIOUS COMORBIDITY AND BODY MASS INDEX (BMI) OF 34.0 TO 34.9 IN ADULT: ICD-10-CM

## 2023-12-07 DIAGNOSIS — Z00.00 ROUTINE HEALTH MAINTENANCE: ICD-10-CM

## 2023-12-07 DIAGNOSIS — E66.09 CLASS 1 OBESITY DUE TO EXCESS CALORIES WITH SERIOUS COMORBIDITY AND BODY MASS INDEX (BMI) OF 33.0 TO 33.9 IN ADULT: ICD-10-CM

## 2023-12-07 DIAGNOSIS — Z11.59 NEED FOR HEPATITIS C SCREENING TEST: ICD-10-CM

## 2023-12-07 DIAGNOSIS — E78.2 MIXED HYPERLIPIDEMIA: ICD-10-CM

## 2023-12-07 DIAGNOSIS — Z11.4 SCREENING FOR HIV WITHOUT PRESENCE OF RISK FACTORS: ICD-10-CM

## 2023-12-07 DIAGNOSIS — Z00.01 ENCOUNTER FOR WELL ADULT EXAM WITH ABNORMAL FINDINGS: ICD-10-CM

## 2023-12-07 DIAGNOSIS — Z12.12 SCREENING FOR COLORECTAL CANCER: ICD-10-CM

## 2023-12-07 DIAGNOSIS — M65.321 TRIGGER INDEX FINGER OF RIGHT HAND: ICD-10-CM

## 2023-12-07 DIAGNOSIS — M65.332 TRIGGER MIDDLE FINGER OF LEFT HAND: ICD-10-CM

## 2023-12-07 PROBLEM — R22.31 MASS OF HAND, RIGHT: Status: RESOLVED | Noted: 2022-10-27 | Resolved: 2023-12-07

## 2023-12-07 LAB
25(OH)D3 SERPL-MCNC: 30 NG/ML (ref 30–100)
ALBUMIN SERPL BCP-MCNC: 4.5 G/DL (ref 3.2–4.9)
ALBUMIN/GLOB SERPL: 1.4 G/DL
ALP SERPL-CCNC: 70 U/L (ref 30–99)
ALT SERPL-CCNC: 17 U/L (ref 2–50)
ANION GAP SERPL CALC-SCNC: 10 MMOL/L (ref 7–16)
AST SERPL-CCNC: 13 U/L (ref 12–45)
BASOPHILS # BLD AUTO: 1.2 % (ref 0–1.8)
BASOPHILS # BLD: 0.08 K/UL (ref 0–0.12)
BILIRUB SERPL-MCNC: 0.4 MG/DL (ref 0.1–1.5)
BUN SERPL-MCNC: 12 MG/DL (ref 8–22)
CALCIUM ALBUM COR SERPL-MCNC: 9.4 MG/DL (ref 8.5–10.5)
CALCIUM SERPL-MCNC: 9.8 MG/DL (ref 8.5–10.5)
CHLORIDE SERPL-SCNC: 107 MMOL/L (ref 96–112)
CHOLEST SERPL-MCNC: 251 MG/DL (ref 100–199)
CO2 SERPL-SCNC: 25 MMOL/L (ref 20–33)
CREAT SERPL-MCNC: 0.72 MG/DL (ref 0.5–1.4)
EOSINOPHIL # BLD AUTO: 0.88 K/UL (ref 0–0.51)
EOSINOPHIL NFR BLD: 13.7 % (ref 0–6.9)
ERYTHROCYTE [DISTWIDTH] IN BLOOD BY AUTOMATED COUNT: 42.2 FL (ref 35.9–50)
GFR SERPLBLD CREATININE-BSD FMLA CKD-EPI: 97 ML/MIN/1.73 M 2
GLOBULIN SER CALC-MCNC: 3.3 G/DL (ref 1.9–3.5)
GLUCOSE SERPL-MCNC: 109 MG/DL (ref 65–99)
HCT VFR BLD AUTO: 47.2 % (ref 37–47)
HCV AB SER QL: NORMAL
HDLC SERPL-MCNC: 33 MG/DL
HGB BLD-MCNC: 15.8 G/DL (ref 12–16)
HIV 1+2 AB+HIV1 P24 AG SERPL QL IA: NORMAL
IMM GRANULOCYTES # BLD AUTO: 0.01 K/UL (ref 0–0.11)
IMM GRANULOCYTES NFR BLD AUTO: 0.2 % (ref 0–0.9)
LDLC SERPL CALC-MCNC: 179 MG/DL
LYMPHOCYTES # BLD AUTO: 2.04 K/UL (ref 1–4.8)
LYMPHOCYTES NFR BLD: 31.7 % (ref 22–41)
MCH RBC QN AUTO: 29.4 PG (ref 27–33)
MCHC RBC AUTO-ENTMCNC: 33.5 G/DL (ref 32.2–35.5)
MCV RBC AUTO: 87.7 FL (ref 81.4–97.8)
MONOCYTES # BLD AUTO: 0.42 K/UL (ref 0–0.85)
MONOCYTES NFR BLD AUTO: 6.5 % (ref 0–13.4)
NEUTROPHILS # BLD AUTO: 3 K/UL (ref 1.82–7.42)
NEUTROPHILS NFR BLD: 46.7 % (ref 44–72)
NRBC # BLD AUTO: 0 K/UL
NRBC BLD-RTO: 0 /100 WBC (ref 0–0.2)
PLATELET # BLD AUTO: 219 K/UL (ref 164–446)
PMV BLD AUTO: 10.5 FL (ref 9–12.9)
POTASSIUM SERPL-SCNC: 4.6 MMOL/L (ref 3.6–5.5)
PROT SERPL-MCNC: 7.8 G/DL (ref 6–8.2)
RBC # BLD AUTO: 5.38 M/UL (ref 4.2–5.4)
SODIUM SERPL-SCNC: 142 MMOL/L (ref 135–145)
TRIGL SERPL-MCNC: 193 MG/DL (ref 0–149)
TSH SERPL DL<=0.005 MIU/L-ACNC: 2.35 UIU/ML (ref 0.38–5.33)
WBC # BLD AUTO: 6.4 K/UL (ref 4.8–10.8)

## 2023-12-07 PROCEDURE — 84443 ASSAY THYROID STIM HORMONE: CPT

## 2023-12-07 PROCEDURE — 85025 COMPLETE CBC W/AUTO DIFF WBC: CPT

## 2023-12-07 PROCEDURE — 99396 PREV VISIT EST AGE 40-64: CPT | Performed by: NURSE PRACTITIONER

## 2023-12-07 PROCEDURE — 87389 HIV-1 AG W/HIV-1&-2 AB AG IA: CPT

## 2023-12-07 PROCEDURE — 99213 OFFICE O/P EST LOW 20 MIN: CPT | Mod: 25 | Performed by: NURSE PRACTITIONER

## 2023-12-07 PROCEDURE — 80061 LIPID PANEL: CPT

## 2023-12-07 PROCEDURE — 82306 VITAMIN D 25 HYDROXY: CPT

## 2023-12-07 PROCEDURE — 3074F SYST BP LT 130 MM HG: CPT | Performed by: NURSE PRACTITIONER

## 2023-12-07 PROCEDURE — 36415 COLL VENOUS BLD VENIPUNCTURE: CPT

## 2023-12-07 PROCEDURE — 80053 COMPREHEN METABOLIC PANEL: CPT

## 2023-12-07 PROCEDURE — 86803 HEPATITIS C AB TEST: CPT

## 2023-12-07 PROCEDURE — 3078F DIAST BP <80 MM HG: CPT | Performed by: NURSE PRACTITIONER

## 2023-12-07 ASSESSMENT — PATIENT HEALTH QUESTIONNAIRE - PHQ9: CLINICAL INTERPRETATION OF PHQ2 SCORE: 0

## 2023-12-07 NOTE — ASSESSMENT & PLAN NOTE
"Chronic, worsening. Reports that her wheezing has been bad lately, she has a \"death rattle\" while sleeping and it will wake her up in the night. She is also have coughing fits that are making her urinary incontinence worse.  "

## 2023-12-07 NOTE — PROGRESS NOTES
"Subjective:   CC:   Chief Complaint   Patient presents with    Annual Exam     HPI:   Елена Julian is a 58 y.o. female who presents for annual exam:    Wheezing  Chronic, worsening. Reports that her wheezing has been bad lately, she has a \"death rattle\" while sleeping and it will wake her up in the night. She is also have coughing fits that are making her urinary incontinence worse.     Anticipatory Guidance:  Cholesterol screenin2023   LDL controlled: 179   Diabetes screenin2023, fasting blood sugar 109  Diet: Recommend more lean meats, fruits, vegetables, whole grains. Struggles to force herself to eat 5 small meals. Currently only eating when she is hungry, but this is usually around 3pm. She is snacking on freeze dried fruit and nuts. Eating a lot of grilled or baked vegetables. No more soda or sweet tea.  Exercise: Encourage regular exercise. Has added more walking in addition to walking at work. When her hands feel better she plans to do strength training.  Substance abuse: No  Safe in relationship: Yes  Seatbelts, bike/motorcycle helmet: Yes  Sun protection: Yes  Dentist: Up to date   Eye doctor: Due for follow up    Cancer Screening:  Colorectal cancer screenin2019  Cervical cancer screenin/3/2018  Breast cancer screenin2022    Infectious Disease Screening/Immunizations:  STI screening: Declines  Chlamydia/Gonorrhea screening: Declines  Hep C screenin2023  HIV screen: 2023  Practices safe sex: Yes    Immunizations:   Influenza: Declines, requested discontinuation   Tetanus: Declines   Pneumonia: Declines  RSV: N/A  Shingrix: Declines   Received HPV series: Aged out    Preventative Care Screening:   Osteoporosis Screening: NA, due at age 66  Tobacco Screening: Never smoker     AAA Screening: N/A    No LMP recorded. Patient is perimenopausal.  No significant bloating/fluid retention, pelvic pain, or dyspareunia. No abnormal vaginal discharge.  No breast " tenderness, mass, nipple discharge, changes in size or contour, or abnormal cyclic discomfort.    OB History    Para Term  AB Living   1 1 1 0 0 1   SAB IAB Ectopic Molar Multiple Live Births   0 0 0 0 0 1   Obstetric Comments   12lb baby boy, 26.3 inches,  177 staples     She  reports being sexually active and has had partner(s) who are male. She reports using the following method of birth control/protection: Condom.  She  has a past medical history of Arthritis, Delayed emergence from general anesthesia, Head ache, Left hand tendonitis (2020), Mass of hand, right (10/27/2022), Mixed hyperlipidemia (2018), and Right hand pain (2019).  She  has a past surgical history that includes lumpectomy (Bilateral); primary c section (1988); lipoma excision (Left, ); and pr neuroplasty & or transpos median nrv carpal kaveh (Right, 2/10/2020).    Family History   Problem Relation Age of Onset    Heart Disease Mother     Lung Disease Mother     No Known Problems Father     Other Sister         Gum disease    Heart Disease Sister     Other Brother         MS    Heart Attack Brother     Lung Disease Brother     Cancer Brother         mouth cancer r/t chewing tobacco    Alcohol/Drug Brother         Yovanny ETOH    Lung Disease Maternal Grandmother     Kidney cancer Maternal Grandmother     Lung Cancer Maternal Grandmother     No Known Problems Paternal Grandmother     No Known Problems Sister      Social History     Tobacco Use    Smoking status: Never    Smokeless tobacco: Never    Tobacco comments:     Never smoked   Vaping Use    Vaping Use: Never used   Substance Use Topics    Alcohol use: Yes     Comment: Maybe drink twice a year    Drug use: No     Patient Active Problem List    Diagnosis Date Noted    Lesion of skin of face 10/27/2022    Dyspareunia in female 2022    Rheumatoid factor positive 2021    Positive anti-CCP test 2021    Hair loss 2021    Wheezing  11/06/2019    Numbness of fingers 11/06/2019    Class 1 obesity due to excess calories with serious comorbidity and body mass index (BMI) of 34.0 to 34.9 in adult 01/03/2019    Vitamin D deficiency 08/03/2018    Mixed hyperlipidemia 08/03/2018    Postmenopausal 06/28/2018    Primary osteoarthritis involving multiple joints 06/28/2018     Current Outpatient Medications   Medication Sig Dispense Refill    albuterol 108 (90 Base) MCG/ACT Aero Soln inhalation aerosol Inhale 1-2 Puffs every 6 hours as needed for Shortness of Breath. 3 Each 3    estradiol (VAGIFEM) 10 MCG Tab        No current facility-administered medications for this visit.     Allergies   Allergen Reactions    Codeine Vomiting     throat swelling    Fluorouracil Itching     Itching    Aspirin Itching     blisters    Ibuprofen Vomiting     And itching    Influenza Vaccines Hives    Latex Itching     And blisters only if worn- OK to be touched by others    Other Drug Shortness of Breath     Allergic to steroids - she was hospitalized in [place] after having steroid medications.     Tylenol Arthritis Pain Rash     Blisters in mouth    Vicodin [Hydrocodone-Acetaminophen] Hives     Sever N/V     Review of Systems   Constitutional: Negative for fever, chills and malaise/fatigue.   HENT: Negative for congestion.    Eyes: Negative for pain.   Respiratory: + Cough and shortness of breath.    Cardiovascular: Negative for chest pain and leg swelling.   Gastrointestinal: Negative for nausea, vomiting, abdominal pain and diarrhea.   Genitourinary: Negative for dysuria and hematuria.   Skin: Negative for rash.   Neurological: Negative for dizziness, focal weakness and headaches.   Endo/Heme/Allergies: Does not bruise/bleed easily.   Psychiatric/Behavioral: Negative for depression.  The patient is not nervous/anxious.      Objective:   /70 (BP Location: Left arm, Patient Position: Sitting, BP Cuff Size: Large adult)   Pulse 80   Temp 36.7 °C (98 °F) (Temporal)    "Resp 16   Ht 1.702 m (5' 7\")   Wt 97.5 kg (215 lb)   SpO2 92%   BMI 33.67 kg/m²     Wt Readings from Last 4 Encounters:   12/12/23 96.1 kg (211 lb 12.8 oz)   12/07/23 97.5 kg (215 lb)   07/06/23 95.3 kg (210 lb)   12/07/22 98.9 kg (218 lb)     Physical Exam:  Constitutional: Well-developed and well-nourished. Not diaphoretic. No distress.   Skin: Skin is warm and dry. No rash noted.  Head: Atraumatic without lesions.  Eyes: Conjunctivae and extraocular motions are normal. Pupils are equal, round, and reactive to light. No scleral icterus.   Ears:  External ears unremarkable. Tympanic membranes clear and intact.  Nose: Nares patent. Septum midline. Turbinates without erythema nor edema. No discharge.   Mouth/Throat: Tongue normal. Oropharynx is clear and moist. Posterior pharynx without erythema or exudates.  Neck: Supple, trachea midline. Normal range of motion. No thyromegaly present. No lymphadenopathy--cervical or supraclavicular.  Cardiovascular: Regular rate and rhythm, S1 and S2 without murmur, rubs, or gallops.    Respiratory: Effort normal. Clear to auscultation throughout. No adventitious sounds.   Breast:  Breast exam deferred. Discussed monthly self exams and what to look for, including peau d'orange or nipple retraction, discharge, breasts moving freely and equally without restriction, axillary/supraclavicular adenopathy, or palpable masses/nodules.  Abdomen: Soft, non tender, and without distention. Active bowel sounds in all four quadrants. No rebound, guarding.  Extremities: No cyanosis, clubbing, erythema, nor edema. Radial pulses intact and symmetric.   Musculoskeletal: All major joints AROM full in all directions without pain.  Neurological: Alert and oriented x 3. Grossly non-focal. Strength and sensation grossly intact.   Psychiatric:  Behavior, mood, and affect are appropriate.    Assessment and Plan:   1. Encounter for well adult exam with abnormal findings    2. Class 1 obesity due to " excess calories with serious comorbidity and body mass index (BMI) of 34.0 to 34.9 in adult  Chronic, ongoing.  Encourage diet high in fruits, vegetables, and fiber. And a diet low in salt, refined carbohydrates, cholesterol, saturated fat, and trans fatty acids.    Encourage a minimum of 30 minutes of moderate intensity aerobic exercise (eg, brisk walking) is recommended on five days each week. Or 30 minutes of vigorous-intensity aerobic exercise (eg, jogging) on three days each week.   Patient's body mass index is 33.67 kg/m². Exercise and nutrition counseling were performed at this visit.  Due for updated annual labs in prior to annual follow-up in December 2024.    - CBC WITH DIFFERENTIAL; Future  - Comp Metabolic Panel; Future  - Lipid Profile; Future  - TSH WITH REFLEX TO FT4; Future    3. Mixed hyperlipidemia  Chronic, ongoing without medication, declines starting. Encourage healthy diet, regular exercise, weight loss. Due for updated annual labs in prior to annual follow-up in December 2024.   - Comp Metabolic Panel; Future  - Lipid Profile; Future  - TSH WITH REFLEX TO FT4; Future    4. Vitamin D deficiency  Chronic, ongoing without supplementation. Due for updated annual labs in prior to annual follow-up in December 2024.   - VITAMIN D,25 HYDROXY (DEFICIENCY); Future    5. Wheezing  6. Shortness of breath  Chronic, worsening. Plan for patient to complete PFT's, referral to pulmonary. Due for updated annual labs in prior to annual follow-up in December 2024.   - CBC WITH DIFFERENTIAL; Future  - PULMONARY FUNCTION TESTS -Test requested: Complete Pulmonary Function Test; Include MIPS/MEPS? Yes; Future  - Referral to Pulmonary and Sleep Medicine    7. Trigger index finger of right hand  8. Trigger middle finger of left hand  Reports trigger fingers worsening bilaterally. Referral to hand surgery.  - Referral to Hand Surgery    9. Encounter for screening mammogram for breast cancer  Due for screening.  -  MA-SCREENING MAMMO BILAT W/TOMOSYNTHESIS W/CAD; Future    10. Routine health maintenance  Due for updated annual labs in prior to annual follow-up in December 2024.   - CBC WITH DIFFERENTIAL; Future  - Comp Metabolic Panel; Future  - HEP C VIRUS ANTIBODY; Future  - HIV AG/AB COMBO ASSAY SCREENING; Future  - Lipid Profile; Future  - TSH WITH REFLEX TO FT4; Future  - VITAMIN D,25 HYDROXY (DEFICIENCY); Future    11. Screening for colorectal cancer  Due for screening.  - Cologuard Colon Cancer Screening (FIT DNA)    12. Need for hepatitis C screening test  Due for one time screening.  - HEP C VIRUS ANTIBODY; Future    13. Screening for HIV without presence of risk factors  Due for one time screening.  - HIV AG/AB COMBO ASSAY SCREENING; Future     Health maintenance: Reviewed  Labs per orders  Immunizations: Declines   Patient counseled about skin care, diet, supplements, and exercise.  Discussed  breast self exam, mammography screening, STD prevention, HIV risk factors and prevention, menopause, osteoporosis, adequate intake of calcium and vitamin D, diet and exercise, colorectal cancer screening.     Follow-up: Return in about 1 year (around 12/7/2024) for Preventative Annual, Follow up Labs.     Please note that this dictation was created using voice recognition software. I have worked with consultants from the vendor as well as technical experts from [employer] to optimize the interface. I have made every reasonable attempt to correct obvious errors, but I expect that there are errors of grammar and possibly content that I did not discover before finalizing the note.

## 2023-12-09 DIAGNOSIS — E55.9 VITAMIN D DEFICIENCY: ICD-10-CM

## 2023-12-09 DIAGNOSIS — E78.2 MIXED HYPERLIPIDEMIA: ICD-10-CM

## 2023-12-09 DIAGNOSIS — Z00.00 ROUTINE HEALTH MAINTENANCE: ICD-10-CM

## 2023-12-09 DIAGNOSIS — R73.01 IMPAIRED FASTING GLUCOSE: ICD-10-CM

## 2023-12-09 DIAGNOSIS — E66.09 CLASS 1 OBESITY DUE TO EXCESS CALORIES WITH SERIOUS COMORBIDITY AND BODY MASS INDEX (BMI) OF 33.0 TO 33.9 IN ADULT: ICD-10-CM

## 2023-12-10 NOTE — PROGRESS NOTES
1. Class 1 obesity due to excess calories with serious comorbidity and body mass index (BMI) of 33.0 to 33.9 in adult  - HEMOGLOBIN A1C; Future  - CBC WITH DIFFERENTIAL; Future  - Comp Metabolic Panel; Future  - Lipid Profile; Future  - TSH WITH REFLEX TO FT4; Future    2. Mixed hyperlipidemia  - HEMOGLOBIN A1C; Future  - Comp Metabolic Panel; Future  - Lipid Profile; Future  - TSH WITH REFLEX TO FT4; Future    3. Vitamin D deficiency  - VITAMIN D,25 HYDROXY (DEFICIENCY); Future    4. Impaired fasting glucose  - HEMOGLOBIN A1C; Future  - Comp Metabolic Panel; Future  - Lipid Profile; Future    5. Routine health maintenance  - HEMOGLOBIN A1C; Future  - CBC WITH DIFFERENTIAL; Future  - Comp Metabolic Panel; Future  - Lipid Profile; Future  - TSH WITH REFLEX TO FT4; Future  - VITAMIN D,25 HYDROXY (DEFICIENCY); Future       Due for annual labs in December 2024 prior to annual follow up.

## 2023-12-12 ENCOUNTER — NON-PROVIDER VISIT (OUTPATIENT)
Dept: SLEEP MEDICINE | Facility: MEDICAL CENTER | Age: 58
End: 2023-12-12
Attending: NURSE PRACTITIONER
Payer: COMMERCIAL

## 2023-12-12 VITALS — BODY MASS INDEX: 34.04 KG/M2 | HEIGHT: 66 IN | WEIGHT: 211.8 LBS

## 2023-12-12 DIAGNOSIS — R06.2 WHEEZING: ICD-10-CM

## 2023-12-12 DIAGNOSIS — R06.02 SHORTNESS OF BREATH: ICD-10-CM

## 2023-12-12 PROCEDURE — 94726 PLETHYSMOGRAPHY LUNG VOLUMES: CPT | Mod: 26 | Performed by: STUDENT IN AN ORGANIZED HEALTH CARE EDUCATION/TRAINING PROGRAM

## 2023-12-12 PROCEDURE — 94729 DIFFUSING CAPACITY: CPT | Performed by: STUDENT IN AN ORGANIZED HEALTH CARE EDUCATION/TRAINING PROGRAM

## 2023-12-12 PROCEDURE — 94729 DIFFUSING CAPACITY: CPT | Mod: 26 | Performed by: STUDENT IN AN ORGANIZED HEALTH CARE EDUCATION/TRAINING PROGRAM

## 2023-12-12 PROCEDURE — 94726 PLETHYSMOGRAPHY LUNG VOLUMES: CPT | Performed by: STUDENT IN AN ORGANIZED HEALTH CARE EDUCATION/TRAINING PROGRAM

## 2023-12-12 PROCEDURE — 94060 EVALUATION OF WHEEZING: CPT | Mod: 26 | Performed by: STUDENT IN AN ORGANIZED HEALTH CARE EDUCATION/TRAINING PROGRAM

## 2023-12-12 PROCEDURE — 94060 EVALUATION OF WHEEZING: CPT | Performed by: STUDENT IN AN ORGANIZED HEALTH CARE EDUCATION/TRAINING PROGRAM

## 2023-12-12 ASSESSMENT — PULMONARY FUNCTION TESTS
FEV1/FVC_PERCENT_PREDICTED: 76
FEV1/FVC_PREDICTED: 79
FVC: 2.96
FEV1: 1.8
FEV1/FVC: 61
FEV1_PREDICTED: 2.7
FEV1/FVC: 61
FEV1_PERCENT_PREDICTED: 66
FVC_PREDICTED: 3.42
FEV1/FVC_PERCENT_PREDICTED: 77
FVC_PERCENT_PREDICTED: 86
FEV1/FVC_PERCENT_PREDICTED: 79

## 2023-12-12 ASSESSMENT — FIBROSIS 4 INDEX: FIB4 SCORE: 0.82

## 2023-12-13 NOTE — PROCEDURES
Tech: Lois Ruggiero, RT  Good patient effort & cooperation.  Test was performed on the [employer] Body Plethysmograph- Elite DX system.  The predicted sets used for Spirometry are GLI-2012, for Lung Volumes are ITS, and for DLCO is GLI 2017.  The results of this test meet the ATS standards for acceptability and repeatability.  The DLCO was uncorrected for Hb.  A bronchodilator of Ventolin HFA 2 puffs via spacer was administered.  DLCO was performed during dilation period.    Interpretation:    There is moderate obstruction without a significant bronchodilator response.  The increase in RV and TLC is consistent with hyperinflation.  Normal diffusion capacity.  MVV is reduced proportionately to the degree of reduction in FEV1.  MIPs are within normal limits.  The MEP is mildly reduced, which may reflect expiratory muscle weakness start (ex; intercostal or abdominal muscle weakness).  Recommend clinical correlation and further workup if indicated.

## 2023-12-18 ENCOUNTER — HOSPITAL ENCOUNTER (OUTPATIENT)
Dept: RADIOLOGY | Facility: MEDICAL CENTER | Age: 58
End: 2023-12-18
Attending: NURSE PRACTITIONER
Payer: COMMERCIAL

## 2023-12-18 DIAGNOSIS — Z12.31 ENCOUNTER FOR SCREENING MAMMOGRAM FOR BREAST CANCER: ICD-10-CM

## 2023-12-18 PROCEDURE — 77063 BREAST TOMOSYNTHESIS BI: CPT

## 2024-01-23 NOTE — RESEARCH NOTE
Unable to connect with patient after multiple contact attempts regarding the available studies the patient would qualify for. Consent forms remain available for the patient to sign via [employer]. Patient has been notified and provided contact information to schedule if they would like to participate.

## 2024-01-28 DIAGNOSIS — R06.2 WHEEZING: ICD-10-CM

## 2024-01-28 RX ORDER — ALBUTEROL SULFATE 90 UG/1
1-2 AEROSOL, METERED RESPIRATORY (INHALATION) EVERY 6 HOURS PRN
Qty: 3 EACH | Refills: 3 | Status: SHIPPED | OUTPATIENT
Start: 2024-01-28 | End: 2024-03-21 | Stop reason: SDUPTHER

## 2024-03-05 ENCOUNTER — APPOINTMENT (RX ONLY)
Dept: URBAN - METROPOLITAN AREA CLINIC 4 | Facility: CLINIC | Age: 59
Setting detail: DERMATOLOGY
End: 2024-03-05

## 2024-03-05 DIAGNOSIS — L82.0 INFLAMED SEBORRHEIC KERATOSIS: ICD-10-CM

## 2024-03-05 PROCEDURE — ? COUNSELING

## 2024-03-05 PROCEDURE — 17110 DESTRUCTION B9 LES UP TO 14: CPT

## 2024-03-05 PROCEDURE — ? BENIGN DESTRUCTION

## 2024-03-05 ASSESSMENT — LOCATION ZONE DERM: LOCATION ZONE: NOSE

## 2024-03-05 ASSESSMENT — LOCATION SIMPLE DESCRIPTION DERM: LOCATION SIMPLE: RIGHT NOSE

## 2024-03-05 ASSESSMENT — LOCATION DETAILED DESCRIPTION DERM: LOCATION DETAILED: RIGHT NASAL SIDEWALL

## 2024-03-05 NOTE — PROCEDURE: BENIGN DESTRUCTION
Medical Necessity Information: It is in your best interest to select a reason for this procedure from the list below. All of these items fulfill various CMS LCD requirements except the new and changing color options.
Consent: The patient's consent was obtained including but not limited to risks of crusting, scabbing, blistering, scarring, darker or lighter pigmentary change, recurrence, incomplete removal and infection.
Medical Necessity Clause: This procedure was medically necessary because the lesions that were treated were:
Add 52 Modifier (Optional): no
Detail Level: Detailed
Treatment Number (Will Not Render If 0): 0
Anesthesia Volume In Cc: 0.5
Post-Care Instructions: I reviewed with the patient in detail post-care instructions. Patient is to wear sunprotection, and avoid picking at any of the treated lesions. Pt may apply Vaseline to crusted or scabbing areas.

## 2024-03-05 NOTE — PROCEDURE: COUNSELING
[facility]  [address] [place] [address]  Phone: [phone]  Fax: [phone]                  Sage Conn   2017 4:40 PM   Office Visit    Description:  Male : 1964   Provider:  JILLIAN Joiner   Department:  [facility]           Reason for Visit     Hypertension           Diagnoses this Visit        Comments    Essential hypertension    -  Primary            To Do List           Goals (5 Years of Data)     None       These Medications        Disp Refills Start End    amlodipine (NORVASC) 10 MG tablet 30 tablet 0 2017    Take 1 tablet (10 mg total) by mouth once daily. - Oral    Pharmacy: [facility] [address] [place] [address] [address] [facility] Ph #: [phone]         [facility] On Call     [facility] On Call Nurse Care Line -  Assistance  Registered nurses in the [facility] On Call Center provide clinical advisement, health education, appointment booking, and other advisory services.  Call for this free service at [phone].             Medications           Message regarding Medications     Verify the changes and/or additions to your medication regime listed below are the same as discussed with your clinician today.  If any of these changes or additions are incorrect, please notify your healthcare provider.        START taking these NEW medications        Refills    amlodipine (NORVASC) 10 MG tablet 0    Sig: Take 1 tablet (10 mg total) by mouth once daily.    Class: Normal    Route: Oral      STOP taking these medications     valacyclovir (VALTREX) 1000 MG tablet Take 1 tablet (1,000 mg total) by mouth 3 (three) times daily.           Verify that the below list of medications is an accurate representation of the medications you are currently taking.  If none reported, the list may be blank. If incorrect, please contact your healthcare provider. Carry this list with  "you in case of emergency.           Current Medications     amlodipine (NORVASC) 10 MG tablet Take 1 tablet (10 mg total) by mouth once daily.           Clinical Reference Information           Your Vitals Were     BP Pulse Temp Height    148/98 (BP Location: Left arm, Patient Position: Sitting, BP Method: Manual) 77 97.4 °F (36.3 °C) (Tympanic) 6' 2" (1.88 m)    Weight SpO2 BMI    94.2 kg (207 lb 10.8 oz) 97% 26.66 kg/m2      Blood Pressure          Most Recent Value    BP  (!)  148/98      Allergies as of 2/21/2017     No Known Allergies      Immunizations Administered on Date of Encounter - 2/21/2017     None      Instructions    Follow up with Primary Care Physician of choice for annual exam with refills      Low-Salt Diet  This diet removes foods that are high in salt. It also limits the amount of salt you use when cooking. It is most often used for people with high blood pressure, edema (fluid retention), and kidney, liver, or heart disease.  Table salt contains the mineral sodium. Your body needs sodium to work normally. But too much sodium can make your health problems worse. Your healthcare provider is recommending a low-salt (also called low-sodium) diet for you. Your total daily allowance of salt is 1,500 to 2,300 milligrams (mg). It is less than 1 teaspoon of table salt. This means you can have only about 500 to 700 mg of sodium at each meal. People with certain health problems should limit salt intake to the lower end of the recommended range.    When you cook, dont add much salt. If you can cook without using salt, even better. Dont add salt to your food at the table.  When shopping, read food labels. Salt is often called sodium on the label. Choose foods that are salt-free, low salt, or very low salt. Note that foods with reduced salt may not lower your salt intake enough.    Beans, potatoes, and pasta  Ok: Dry beans, split peas, lentils, potatoes, rice, macaroni, pasta, spaghetti without added " salt  Avoid: Potato chips, tortilla chips, and similar products  Breads and cereals  Ok: Low-sodium breads, rolls, cereals, and cakes; low-salt crackers, matzo crackers  Avoid: Salted crackers, pretzels, popcorn, Serbian toast, pancakes, muffins  Dairy  Ok: Milk, chocolate milk, hot chocolate mix, low-salt cheeses, and yogurt  Avoid: Processed cheese and cheese spreads; Roquefort, Camembert, and cottage cheese; buttermilk, instant breakfast drink  Desserts  Ok: Ice cream, frozen yogurt, juice bars, gelatin, cookies and pies, sugar, honey, jelly, hard candy  Avoid: Most pies, cakes and cookies prepared or processed with salt; instant pudding  Drinks  Ok: Tea, coffee, fizzy (carbonated) drinks, juices  Avoid: Flavored coffees, electrolyte replacement drinks, sports drinks  Meats  Ok: All fresh meat, fish, poultry, low-salt tuna, eggs, egg substitute  Avoid: Smoked, pickled, brine-cured, or salted meats and fish. This includes cavazos, chipped beef, corned beef, hot dogs, deli meats, ham, kosher meats, salt pork, sausage, canned tuna, salted codfish, smoked salmon, herring, sardines, or anchovies.  Seasonings and spices  Ok: Most seasonings are okay. Good substitutes for salt include: fresh herb blends, hot sauce, lemon, garlic, hough, vinegar, dry mustard, parsley, cilantro, horseradish, tomato paste, regular margarine, mayonnaise, unsalted butter, cream cheese, vegetable oil, cream, low-salt salad dressing and gravy.  Avoid: Regular ketchup, relishes, pickles, soy sauce, teriyaki sauce, Worcestershire sauce, BBQ sauce, tartar sauce, meat tenderizer, chili sauce, regular gravy, regular salad dressing, salted butter  Soups  Ok: Low-salt soups and broths made with allowed foods  Avoid: Bouillon cubes, soups with smoked or salted meats, regular soup and broth  Vegetables  Ok: Most vegetables are okay; also low-salt tomato and vegetable juices  Avoid: Sauerkraut and other brine-soaked vegetables; pickles and other pickled  vegetables; tomato juice, olives  Date Last Reviewed: 8/1/2016  © 2983-4543 [employer]. [address] [place] [address], [place] [address]. All rights reserved. This information is not intended as a substitute for professional medical care. Always follow your healthcare professional's instructions.             Language Assistance Services     ATTENTION: Language assistance services are available, free of charge. Please call [phone].      ATENCIÓN: Si habla español, tiene a mcclain disposición servicios gratuitos de asistencia lingüística. Llame al [phone].     CHÚ Ý: N?u b?n nói Ti?ng Vi?t, có các d?ch v? h? tr? ngôn ng? mi?n phí dành cho b?n. G?i s? [phone].         [facility] complies with applicable Federal civil rights laws and does not discriminate on the basis of race, color, national origin, age, disability, or sex.         Detail Level: Detailed

## 2024-03-18 ASSESSMENT — ENCOUNTER SYMPTOMS
DYSPNEA AT REST: 1
WHEEZING: 1
CHEST TIGHTNESS: 1
RESPIRATORY SYMPTOMS COMMENTS: NO
HEMOPTYSIS: 0
SHORTNESS OF BREATH: 1

## 2024-03-19 NOTE — PROGRESS NOTES
Pulmonary Clinic- Initial Consult    Date of Service: 3/21/24    Referring Physician: Natalya Epps A.P.*    Reason for Consult: Wheezing and SOB    Chief Complaint:   Chief Complaint   Patient presents with    Establish Care     *PULM NP/NATALYA EPPS R/DX:WHEEZING, SOB        HPI:   Елена Julian is a 58 y.o. female who is followed by Natalya Epps and is referred to the pulmonary clinic for wheezing and SOB.  She reports her wheezing started about 1 year ago.  She wakes up most nights with wheezing and shortness of breath.  She coughs which is sometimes productive of clear to white phlegm.  Albuterol helps her symptoms significantly but this is the only medication she has been given recently.  She did try Symbicort about a year ago but it caused her to break out in hives.  She has many medication allergies.  She feels she actually does not have seasonal allergies.  Eosinophils are markedly elevated.  No IgE is available today.  She is a never smoker.  She has a history of mild acid reflux which does not generally bother her.  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  Her symptoms also seem to be exacerbated by exertion and heat as well as strong smells like perfumes.      Past Medical History:   Diagnosis Date    Arthritis     Cough     Delayed emergence from general anesthesia     Head ache     post     Left hand tendonitis 2020    Mass of hand, right 10/27/2022    Mixed hyperlipidemia 2018    Painful breathing     Right hand pain 2019    Shortness of breath     Sputum production     Wheezing        Past Surgical History:   Procedure Laterality Date    [place] NEUROPLASTY & OR TRANSPOS MEDIAN NRV CARPAL TASH Right 2/10/2020    Procedure: RELEASE, CARPAL TUNNEL;  Surgeon: Georgina Steinberg M.D.;  Location: SURGERY [facility];  Service: Orthopedics    LIPOMA EXCISION Left     wrist    PRIMARY C SECTION  1988     LUMPECTOMY Bilateral        Social History     Socioeconomic History    Marital status:      Spouse name: Not on file    Number of children: Not on file    Years of education: Not on file    Highest education level: Professional school degree (e.g., MD, DDS, DVM, DANELLE)   Occupational History     Employer: [employer]   Tobacco Use    Smoking status: Never    Smokeless tobacco: Never    Tobacco comments:     Never smoked   Vaping Use    Vaping Use: Never used   Substance and Sexual Activity    Alcohol use: Yes     Comment: Maybe drink twice a year    Drug use: No    Sexual activity: Yes     Partners: Male     Birth control/protection: Condom     Comment: Not  and working as a manager at "[employer]   Other Topics Concern    Not on file   Social History Narrative    Not on file     Social Determinants of Health     Financial Resource Strain: Low Risk  (12/4/2023)    Overall Financial Resource Strain (CARDIA)     Difficulty of Paying Living Expenses: Not very hard   Food Insecurity: No Food Insecurity (12/4/2023)    Hunger Vital Sign     Worried About Running Out of Food in the Last Year: Never true     Ran Out of Food in the Last Year: Never true   Transportation Needs: No Transportation Needs (12/4/2023)    PRAPARE - Transportation     Lack of Transportation (Medical): No     Lack of Transportation (Non-Medical): No   Physical Activity: Unknown (12/4/2023)    Exercise Vital Sign     Days of Exercise per Week: 5 days     Minutes of Exercise per Session: Patient declined   Stress: Stress Concern Present (12/4/2023)    [employer] - Occupational Stress Questionnaire     Feeling of Stress : To some extent   Social Connections: Socially Isolated (12/4/2023)    Social Connection and Isolation Panel [NHANES]     Frequency of Communication with Friends and Family: More than three times a week     Frequency of Social Gatherings with Friends and Family: Once a week      Attends Rastafari Services: Never     Active Member of Clubs or Organizations: No     Attends Club or Organization Meetings: Never     Marital Status:    Intimate Partner Violence: Not on file   Housing Stability: Low Risk  (12/4/2023)    Housing Stability Vital Sign     Unable to Pay for Housing in the Last Year: No     Number of Places Lived in the Last Year: 1     Unstable Housing in the Last Year: No          Family History   Problem Relation Age of Onset    Heart Disease Mother     Lung Disease Mother     No Known Problems Father     Other Sister         Gum disease    Heart Disease Sister     Other Brother         MS    Heart Attack Brother     Lung Disease Brother     Cancer Brother         mouth cancer r/t chewing tobacco    Alcohol/Drug Brother         Yovanny ETOH    Lung Disease Maternal Grandmother     Kidney cancer Maternal Grandmother     Lung Cancer Maternal Grandmother     No Known Problems Paternal Grandmother     No Known Problems Sister        Current Outpatient Medications on File Prior to Visit   Medication Sig Dispense Refill    estradiol (VAGIFEM) 10 MCG Tab        No current facility-administered medications on file prior to visit.       Allergies: Codeine, Fluorouracil, Aspirin, Ibuprofen, Influenza vaccines, Latex, Other drug, Tylenol arthritis pain, and Vicodin [hydrocodone-acetaminophen]      ROS:   Review of Systems   Constitutional:  Negative for chills, fever and malaise/fatigue.   HENT:  Negative for congestion.    Respiratory:  Positive for cough, sputum production, shortness of breath and wheezing. Negative for hemoptysis.    Cardiovascular:  Negative for chest pain, palpitations and leg swelling.   Gastrointestinal:  Negative for heartburn.   Neurological:  Negative for dizziness and headaches.   Endo/Heme/Allergies:  Positive for environmental allergies.       Vitals:  /60 (BP Location: Left arm, Patient Position: Sitting, BP Cuff Size: Large adult)   Pulse 67   Ht  "1.702 m (5' 7\")   Wt 99.3 kg (219 lb)   SpO2 100%     Physical Exam:  Physical Exam  Constitutional:       Appearance: Normal appearance.   HENT:      Head: Atraumatic.      Mouth/Throat:      Mouth: Mucous membranes are dry.   Cardiovascular:      Rate and Rhythm: Normal rate and regular rhythm.   Pulmonary:      Effort: Pulmonary effort is normal. No respiratory distress.      Breath sounds: Wheezing present. No rales.   Musculoskeletal:         General: No swelling.   Skin:     General: Skin is warm and dry.   Neurological:      General: No focal deficit present.      Mental Status: She is alert and oriented to person, place, and time.           Vaccinations:    Pneumovax: Not Due  Covid: None 2/2 severe vaccine reaction in the past requiring hospitalization  Annual Flu: None 2/2 severe vaccine reaction in the past requiring hospitalization    Pertinent Studies:  Laboratory Data:    6MWT:    PFTs as reviewed by me personally show:      Imaging as reviewed by me personally show:      Pertinent Cardiac Studies:  Echo:      Assessment/Plan:    Problem List Items Addressed This Visit       Mild intermittent asthma with acute exacerbation     Uncontrolled.  Prior adverse reaction to symbicort.  Plan:  - Trial Advair diskus 1 puff BID  - Start daily OTC allergy medication  - Albuterol PRN, if unable to tolerate Advair will need to use albuterol Q4H  - IgE  - Trial Flonase if nasal symptoms  - Follow up in 6 months         Relevant Medications    fluticasone-salmeterol (ADVAIR DISKUS) 250-50 MCG/ACT AEROSOL POWDER, BREATH ACTIVATED    albuterol 108 (90 Base) MCG/ACT Aero Soln inhalation aerosol    Other Relevant Orders    IGE SERUM    RESOLVED: Wheezing    Relevant Medications    albuterol 108 (90 Base) MCG/ACT Aero Soln inhalation aerosol     Other Visit Diagnoses       Shortness of breath                 Return in about 6 months (around 9/21/2024) for Dr. James .     This note was generated using voice recognition " software which has a chance of producing errors of grammar and possibly content.  I have made every reasonable attempt to find and correct any obvious errors, but it should be expected that some may not be found prior to finalization of this note.    Time spent in record review prior to patient arrival, reviewing results, and in face-to-face encounter totaled 30 min, excluding any procedures if performed.      Lin James MD RD  Pulmonary and Critical Care Medicine  [facility]

## 2024-03-21 ENCOUNTER — OFFICE VISIT (OUTPATIENT)
Dept: SLEEP MEDICINE | Facility: MEDICAL CENTER | Age: 59
End: 2024-03-21
Attending: NURSE PRACTITIONER
Payer: COMMERCIAL

## 2024-03-21 VITALS
WEIGHT: 219 LBS | OXYGEN SATURATION: 100 % | BODY MASS INDEX: 34.37 KG/M2 | HEIGHT: 67 IN | DIASTOLIC BLOOD PRESSURE: 60 MMHG | SYSTOLIC BLOOD PRESSURE: 106 MMHG | HEART RATE: 67 BPM

## 2024-03-21 DIAGNOSIS — R06.02 SHORTNESS OF BREATH: ICD-10-CM

## 2024-03-21 DIAGNOSIS — R06.2 WHEEZING: ICD-10-CM

## 2024-03-21 DIAGNOSIS — J45.21 MILD INTERMITTENT ASTHMA WITH ACUTE EXACERBATION: ICD-10-CM

## 2024-03-21 PROCEDURE — 99211 OFF/OP EST MAY X REQ PHY/QHP: CPT | Performed by: INTERNAL MEDICINE

## 2024-03-21 PROCEDURE — 99203 OFFICE O/P NEW LOW 30 MIN: CPT | Performed by: INTERNAL MEDICINE

## 2024-03-21 PROCEDURE — 3074F SYST BP LT 130 MM HG: CPT | Performed by: INTERNAL MEDICINE

## 2024-03-21 PROCEDURE — 3078F DIAST BP <80 MM HG: CPT | Performed by: INTERNAL MEDICINE

## 2024-03-21 RX ORDER — ALBUTEROL SULFATE 90 UG/1
1-2 AEROSOL, METERED RESPIRATORY (INHALATION) 4 TIMES DAILY
Qty: 1 EACH | Refills: 12 | Status: SHIPPED | OUTPATIENT
Start: 2024-03-21

## 2024-03-21 RX ORDER — FLUTICASONE PROPIONATE AND SALMETEROL 250; 50 UG/1; UG/1
1 POWDER RESPIRATORY (INHALATION) 2 TIMES DAILY
Qty: 1 EACH | Refills: 6 | Status: SHIPPED | OUTPATIENT
Start: 2024-03-21

## 2024-03-21 ASSESSMENT — FIBROSIS 4 INDEX: FIB4 SCORE: 0.82

## 2024-03-21 ASSESSMENT — ENCOUNTER SYMPTOMS
COUGH: 1
DIZZINESS: 0
WHEEZING: 1
SHORTNESS OF BREATH: 1
FEVER: 0
SPUTUM PRODUCTION: 1
HEARTBURN: 0
PALPITATIONS: 0
CHILLS: 0
HEADACHES: 0
HEMOPTYSIS: 0

## 2024-03-21 NOTE — ASSESSMENT & PLAN NOTE
Uncontrolled.  Prior adverse reaction to symbicort.  Plan:  - Trial Advair diskus 1 puff BID  - Start daily OTC allergy medication  - Albuterol PRN, if unable to tolerate Advair will need to use albuterol Q4H  - IgE  - Trial Flonase if nasal symptoms  - Follow up in 6 months

## 2024-04-09 ENCOUNTER — APPOINTMENT (RX ONLY)
Dept: URBAN - METROPOLITAN AREA CLINIC 4 | Facility: CLINIC | Age: 59
Setting detail: DERMATOLOGY
End: 2024-04-09

## 2024-04-09 DIAGNOSIS — L82.0 INFLAMED SEBORRHEIC KERATOSIS: ICD-10-CM

## 2024-04-09 PROCEDURE — 17110 DESTRUCTION B9 LES UP TO 14: CPT

## 2024-04-09 PROCEDURE — ? COUNSELING

## 2024-04-09 PROCEDURE — ? BENIGN DESTRUCTION

## 2024-04-09 ASSESSMENT — LOCATION DETAILED DESCRIPTION DERM: LOCATION DETAILED: RIGHT NASAL SIDEWALL

## 2024-04-09 ASSESSMENT — LOCATION SIMPLE DESCRIPTION DERM: LOCATION SIMPLE: RIGHT NOSE

## 2024-04-09 ASSESSMENT — LOCATION ZONE DERM: LOCATION ZONE: NOSE

## 2024-04-19 DIAGNOSIS — J45.21 MILD INTERMITTENT ASTHMA WITH ACUTE EXACERBATION: ICD-10-CM

## 2024-04-20 NOTE — TELEPHONE ENCOUNTER
Have we ever prescribed this med? Yes.  If yes, what date? 03/21/24    Last OV: 03/21/24 with Dr James    Next OV: No Pending appt.     DX:     Medications:   Requested Prescriptions     Pending Prescriptions Disp Refills    fluticasone-salmeterol (ADVAIR DISKUS) 250-50 MCG/ACT AEROSOL POWDER, BREATH ACTIVATED 1 Each 6     Sig: Inhale 1 Puff 2 times a day. Rinse mouth after each use.

## 2024-04-30 ENCOUNTER — TELEPHONE (OUTPATIENT)
Dept: SLEEP MEDICINE | Facility: MEDICAL CENTER | Age: 59
End: 2024-04-30
Payer: COMMERCIAL

## 2024-04-30 RX ORDER — FLUTICASONE PROPIONATE AND SALMETEROL 250; 50 UG/1; UG/1
1 POWDER RESPIRATORY (INHALATION) 2 TIMES DAILY
Qty: 1 EACH | Refills: 6 | Status: SHIPPED | OUTPATIENT
Start: 2024-04-30

## 2024-04-30 NOTE — TELEPHONE ENCOUNTER
Received [employer] request via [employer]  for fluticasone-salmeterol (ADVAIR DISKUS) 250-50 MCG/ACT AEROSOL POWDER, BREATH ACTIVATED  . (Quantity:60, Day Supply:30)     Insurance: KHUSH[employer]  Member ID:  [id number]  BIN: [id number]  PCN: [id number]  Group: [id number]     Ran Test claim via [employer] [facility]  REFILL TOO SOON UNTIL 05/09/2024. LAST FILL 04/25/2024 VIA RETAIL

## 2024-05-09 ENCOUNTER — APPOINTMENT (RX ONLY)
Dept: URBAN - METROPOLITAN AREA CLINIC 4 | Facility: CLINIC | Age: 59
Setting detail: DERMATOLOGY
End: 2024-05-09

## 2024-05-09 DIAGNOSIS — L82.0 INFLAMED SEBORRHEIC KERATOSIS: ICD-10-CM | Status: RESOLVED

## 2024-05-09 PROCEDURE — ? COUNSELING

## 2024-05-09 ASSESSMENT — LOCATION SIMPLE DESCRIPTION DERM: LOCATION SIMPLE: RIGHT NOSE

## 2024-05-09 ASSESSMENT — LOCATION DETAILED DESCRIPTION DERM: LOCATION DETAILED: RIGHT NASAL SIDEWALL

## 2024-05-09 ASSESSMENT — LOCATION ZONE DERM: LOCATION ZONE: NOSE

## 2024-07-11 DIAGNOSIS — R06.2 WHEEZING: ICD-10-CM

## 2024-07-11 DIAGNOSIS — J45.21 MILD INTERMITTENT ASTHMA WITH ACUTE EXACERBATION: ICD-10-CM

## 2024-07-12 RX ORDER — FLUTICASONE PROPIONATE AND SALMETEROL 250; 50 UG/1; UG/1
1 POWDER RESPIRATORY (INHALATION) 2 TIMES DAILY
Qty: 3 EACH | Refills: 3 | Status: SHIPPED | OUTPATIENT
Start: 2024-07-12

## 2024-07-12 RX ORDER — ALBUTEROL SULFATE 90 UG/1
1-2 AEROSOL, METERED RESPIRATORY (INHALATION) 4 TIMES DAILY
Qty: 3 EACH | Refills: 3 | Status: SHIPPED | OUTPATIENT
Start: 2024-07-12 | End: 2024-07-17 | Stop reason: SDUPTHER

## 2024-07-17 DIAGNOSIS — R06.2 WHEEZING: ICD-10-CM

## 2024-07-17 RX ORDER — ALBUTEROL SULFATE 90 UG/1
1-2 AEROSOL, METERED RESPIRATORY (INHALATION) 4 TIMES DAILY
Qty: 3 EACH | Refills: 3 | Status: SHIPPED | OUTPATIENT
Start: 2024-07-17

## 2024-10-02 ENCOUNTER — PATIENT MESSAGE (OUTPATIENT)
Dept: SLEEP MEDICINE | Facility: MEDICAL CENTER | Age: 59
End: 2024-10-02
Payer: COMMERCIAL

## 2024-10-02 DIAGNOSIS — J45.21 MILD INTERMITTENT ASTHMA WITH ACUTE EXACERBATION: ICD-10-CM

## 2024-10-04 ENCOUNTER — OFFICE VISIT (OUTPATIENT)
Dept: SLEEP MEDICINE | Facility: MEDICAL CENTER | Age: 59
End: 2024-10-04
Payer: COMMERCIAL

## 2024-10-04 ENCOUNTER — TELEPHONE (OUTPATIENT)
Dept: SLEEP MEDICINE | Facility: MEDICAL CENTER | Age: 59
End: 2024-10-04

## 2024-10-04 VITALS
HEART RATE: 81 BPM | OXYGEN SATURATION: 93 % | SYSTOLIC BLOOD PRESSURE: 126 MMHG | WEIGHT: 213 LBS | BODY MASS INDEX: 33.43 KG/M2 | HEIGHT: 67 IN | DIASTOLIC BLOOD PRESSURE: 72 MMHG

## 2024-10-04 DIAGNOSIS — J45.21 MILD INTERMITTENT ASTHMA WITH ACUTE EXACERBATION: ICD-10-CM

## 2024-10-04 PROCEDURE — 3078F DIAST BP <80 MM HG: CPT

## 2024-10-04 PROCEDURE — 99213 OFFICE O/P EST LOW 20 MIN: CPT

## 2024-10-04 PROCEDURE — 3074F SYST BP LT 130 MM HG: CPT

## 2024-10-04 PROCEDURE — 99211 OFF/OP EST MAY X REQ PHY/QHP: CPT

## 2024-10-04 RX ORDER — PREDNISONE 10 MG/1
TABLET ORAL
Qty: 18 TABLET | Refills: 0 | Status: SHIPPED | OUTPATIENT
Start: 2024-10-04

## 2024-10-04 RX ORDER — FLUTICASONE PROPIONATE AND SALMETEROL 250; 50 UG/1; UG/1
1 POWDER RESPIRATORY (INHALATION) 2 TIMES DAILY
Qty: 3 EACH | Refills: 3 | Status: SHIPPED | OUTPATIENT
Start: 2024-10-04 | End: 2024-10-04

## 2024-10-04 RX ORDER — FLUTICASONE PROPIONATE AND SALMETEROL 250; 50 UG/1; UG/1
1 POWDER RESPIRATORY (INHALATION) 2 TIMES DAILY
Qty: 3 EACH | Refills: 3 | Status: SHIPPED | OUTPATIENT
Start: 2024-10-04 | End: 2024-10-04 | Stop reason: SDUPTHER

## 2024-10-04 RX ORDER — FLUTICASONE PROPIONATE AND SALMETEROL 250; 50 UG/1; UG/1
1 POWDER RESPIRATORY (INHALATION) 2 TIMES DAILY
Qty: 3 EACH | Refills: 3 | Status: SHIPPED | OUTPATIENT
Start: 2024-10-04 | End: 2024-10-10

## 2024-10-04 ASSESSMENT — ENCOUNTER SYMPTOMS
CHILLS: 0
SINUS PAIN: 0
HEADACHES: 0
WHEEZING: 1
SPUTUM PRODUCTION: 1
DIAPHORESIS: 0
WEAKNESS: 0
MYALGIAS: 0
PALPITATIONS: 0
FALLS: 0
SHORTNESS OF BREATH: 1
VOMITING: 0
HEMOPTYSIS: 0
FEVER: 0
DIZZINESS: 0
HEARTBURN: 0
COUGH: 1
NAUSEA: 0
DIARRHEA: 0

## 2024-10-04 ASSESSMENT — FIBROSIS 4 INDEX: FIB4 SCORE: 0.84

## 2024-10-10 RX ORDER — FLUTICASONE PROPIONATE AND SALMETEROL 250; 50 UG/1; UG/1
1 POWDER RESPIRATORY (INHALATION) 2 TIMES DAILY
Qty: 180 EACH | Refills: 3 | Status: SHIPPED | OUTPATIENT
Start: 2024-10-10

## 2024-11-21 ENCOUNTER — HOSPITAL ENCOUNTER (OUTPATIENT)
Dept: LAB | Facility: MEDICAL CENTER | Age: 59
End: 2024-11-21
Attending: INTERNAL MEDICINE
Payer: COMMERCIAL

## 2024-11-21 ENCOUNTER — HOSPITAL ENCOUNTER (OUTPATIENT)
Dept: LAB | Facility: MEDICAL CENTER | Age: 59
End: 2024-11-21
Attending: NURSE PRACTITIONER
Payer: COMMERCIAL

## 2024-11-21 ENCOUNTER — APPOINTMENT (OUTPATIENT)
Dept: MEDICAL GROUP | Facility: PHYSICIAN GROUP | Age: 59
End: 2024-11-21
Payer: COMMERCIAL

## 2024-11-21 VITALS
DIASTOLIC BLOOD PRESSURE: 80 MMHG | HEART RATE: 83 BPM | OXYGEN SATURATION: 93 % | SYSTOLIC BLOOD PRESSURE: 118 MMHG | BODY MASS INDEX: 33.43 KG/M2 | HEIGHT: 67 IN | WEIGHT: 213 LBS | TEMPERATURE: 97.2 F

## 2024-11-21 DIAGNOSIS — Z12.12 SCREENING FOR COLORECTAL CANCER: ICD-10-CM

## 2024-11-21 DIAGNOSIS — E66.811 CLASS 1 OBESITY DUE TO EXCESS CALORIES WITH SERIOUS COMORBIDITY AND BODY MASS INDEX (BMI) OF 33.0 TO 33.9 IN ADULT: ICD-10-CM

## 2024-11-21 DIAGNOSIS — J45.21 MILD INTERMITTENT ASTHMA WITH ACUTE EXACERBATION: ICD-10-CM

## 2024-11-21 DIAGNOSIS — Z12.31 ENCOUNTER FOR SCREENING MAMMOGRAM FOR BREAST CANCER: ICD-10-CM

## 2024-11-21 DIAGNOSIS — E66.09 CLASS 1 OBESITY DUE TO EXCESS CALORIES WITH SERIOUS COMORBIDITY AND BODY MASS INDEX (BMI) OF 33.0 TO 33.9 IN ADULT: ICD-10-CM

## 2024-11-21 DIAGNOSIS — Z00.00 ROUTINE HEALTH MAINTENANCE: ICD-10-CM

## 2024-11-21 DIAGNOSIS — E78.2 MIXED HYPERLIPIDEMIA: ICD-10-CM

## 2024-11-21 DIAGNOSIS — Z12.11 SCREENING FOR COLORECTAL CANCER: ICD-10-CM

## 2024-11-21 DIAGNOSIS — M15.0 PRIMARY OSTEOARTHRITIS INVOLVING MULTIPLE JOINTS: ICD-10-CM

## 2024-11-21 DIAGNOSIS — H92.01 RIGHT EAR PAIN: ICD-10-CM

## 2024-11-21 DIAGNOSIS — H61.21 RIGHT EAR IMPACTED CERUMEN: ICD-10-CM

## 2024-11-21 DIAGNOSIS — R73.01 IMPAIRED FASTING GLUCOSE: ICD-10-CM

## 2024-11-21 DIAGNOSIS — E55.9 VITAMIN D DEFICIENCY: ICD-10-CM

## 2024-11-21 LAB
25(OH)D3 SERPL-MCNC: 26 NG/ML (ref 30–100)
ALBUMIN SERPL BCP-MCNC: 4.3 G/DL (ref 3.2–4.9)
ALBUMIN/GLOB SERPL: 1.3 G/DL
ALP SERPL-CCNC: 73 U/L (ref 30–99)
ALT SERPL-CCNC: 20 U/L (ref 2–50)
ANION GAP SERPL CALC-SCNC: 12 MMOL/L (ref 7–16)
AST SERPL-CCNC: 22 U/L (ref 12–45)
BASOPHILS # BLD AUTO: 0.9 % (ref 0–1.8)
BASOPHILS # BLD: 0.06 K/UL (ref 0–0.12)
BILIRUB SERPL-MCNC: 0.4 MG/DL (ref 0.1–1.5)
BUN SERPL-MCNC: 15 MG/DL (ref 8–22)
CALCIUM ALBUM COR SERPL-MCNC: 9.2 MG/DL (ref 8.5–10.5)
CALCIUM SERPL-MCNC: 9.4 MG/DL (ref 8.5–10.5)
CHLORIDE SERPL-SCNC: 103 MMOL/L (ref 96–112)
CHOLEST SERPL-MCNC: 247 MG/DL (ref 100–199)
CO2 SERPL-SCNC: 22 MMOL/L (ref 20–33)
CREAT SERPL-MCNC: 0.69 MG/DL (ref 0.5–1.4)
EOSINOPHIL # BLD AUTO: 0.29 K/UL (ref 0–0.51)
EOSINOPHIL NFR BLD: 4.2 % (ref 0–6.9)
ERYTHROCYTE [DISTWIDTH] IN BLOOD BY AUTOMATED COUNT: 41.9 FL (ref 35.9–50)
EST. AVERAGE GLUCOSE BLD GHB EST-MCNC: 128 MG/DL
FASTING STATUS PATIENT QL REPORTED: NORMAL
GFR SERPLBLD CREATININE-BSD FMLA CKD-EPI: 100 ML/MIN/1.73 M 2
GLOBULIN SER CALC-MCNC: 3.3 G/DL (ref 1.9–3.5)
GLUCOSE SERPL-MCNC: 106 MG/DL (ref 65–99)
HBA1C MFR BLD: 6.1 % (ref 4–5.6)
HCT VFR BLD AUTO: 45.4 % (ref 37–47)
HDLC SERPL-MCNC: 34 MG/DL
HGB BLD-MCNC: 15.3 G/DL (ref 12–16)
IMM GRANULOCYTES # BLD AUTO: 0.02 K/UL (ref 0–0.11)
IMM GRANULOCYTES NFR BLD AUTO: 0.3 % (ref 0–0.9)
LDLC SERPL CALC-MCNC: 175 MG/DL
LYMPHOCYTES # BLD AUTO: 1.95 K/UL (ref 1–4.8)
LYMPHOCYTES NFR BLD: 28.3 % (ref 22–41)
MCH RBC QN AUTO: 29.5 PG (ref 27–33)
MCHC RBC AUTO-ENTMCNC: 33.7 G/DL (ref 32.2–35.5)
MCV RBC AUTO: 87.5 FL (ref 81.4–97.8)
MONOCYTES # BLD AUTO: 0.51 K/UL (ref 0–0.85)
MONOCYTES NFR BLD AUTO: 7.4 % (ref 0–13.4)
NEUTROPHILS # BLD AUTO: 4.07 K/UL (ref 1.82–7.42)
NEUTROPHILS NFR BLD: 58.9 % (ref 44–72)
NRBC # BLD AUTO: 0 K/UL
NRBC BLD-RTO: 0 /100 WBC (ref 0–0.2)
PLATELET # BLD AUTO: 226 K/UL (ref 164–446)
PMV BLD AUTO: 10.3 FL (ref 9–12.9)
POTASSIUM SERPL-SCNC: 4.2 MMOL/L (ref 3.6–5.5)
PROT SERPL-MCNC: 7.6 G/DL (ref 6–8.2)
RBC # BLD AUTO: 5.19 M/UL (ref 4.2–5.4)
SODIUM SERPL-SCNC: 137 MMOL/L (ref 135–145)
TRIGL SERPL-MCNC: 190 MG/DL (ref 0–149)
TSH SERPL DL<=0.005 MIU/L-ACNC: 2.35 UIU/ML (ref 0.38–5.33)
WBC # BLD AUTO: 6.9 K/UL (ref 4.8–10.8)

## 2024-11-21 PROCEDURE — 36415 COLL VENOUS BLD VENIPUNCTURE: CPT

## 2024-11-21 PROCEDURE — 80053 COMPREHEN METABOLIC PANEL: CPT

## 2024-11-21 PROCEDURE — 3079F DIAST BP 80-89 MM HG: CPT | Performed by: NURSE PRACTITIONER

## 2024-11-21 PROCEDURE — 82785 ASSAY OF IGE: CPT

## 2024-11-21 PROCEDURE — 83036 HEMOGLOBIN GLYCOSYLATED A1C: CPT

## 2024-11-21 PROCEDURE — 99214 OFFICE O/P EST MOD 30 MIN: CPT | Performed by: NURSE PRACTITIONER

## 2024-11-21 PROCEDURE — 84443 ASSAY THYROID STIM HORMONE: CPT

## 2024-11-21 PROCEDURE — 82306 VITAMIN D 25 HYDROXY: CPT

## 2024-11-21 PROCEDURE — 80061 LIPID PANEL: CPT

## 2024-11-21 PROCEDURE — 85025 COMPLETE CBC W/AUTO DIFF WBC: CPT

## 2024-11-21 PROCEDURE — 3074F SYST BP LT 130 MM HG: CPT | Performed by: NURSE PRACTITIONER

## 2024-11-21 ASSESSMENT — ENCOUNTER SYMPTOMS
ENDOCRINE NEGATIVE: 1
RHINORRHEA: 0
NEUROLOGICAL NEGATIVE: 1
MUSCULOSKELETAL NEGATIVE: 1
COUGH: 0
TROUBLE SWALLOWING: 0
SINUS PAIN: 0
NAUSEA: 1
EYES NEGATIVE: 1
SHORTNESS OF BREATH: 0
CHEST TIGHTNESS: 0
FACIAL SWELLING: 0
PSYCHIATRIC NEGATIVE: 1
CARDIOVASCULAR NEGATIVE: 1
FEVER: 0
HEMATOLOGIC/LYMPHATIC NEGATIVE: 1
SORE THROAT: 0
DIARRHEA: 1
SINUS PRESSURE: 0
CHILLS: 0
ALLERGIC/IMMUNOLOGIC NEGATIVE: 1

## 2024-11-21 ASSESSMENT — PAIN SCALES - GENERAL: PAINLEVEL_OUTOF10: 2=MINIMAL-SLIGHT

## 2024-11-21 ASSESSMENT — FIBROSIS 4 INDEX: FIB4 SCORE: 0.84

## 2024-11-21 ASSESSMENT — PATIENT HEALTH QUESTIONNAIRE - PHQ9: CLINICAL INTERPRETATION OF PHQ2 SCORE: 0

## 2024-11-21 NOTE — PROGRESS NOTES
Verbal consent was acquired by the patient to use [employer] ambient listening note generation during this visit Yes      Subjective   Елена Julian is a 59 y.o. female who presents for:  History of Present Illness  The patient presents for evaluation of multiple medical concerns.    She is experiencing back pain, body aches, and a suspected ear infection. She reports difficulty hearing from her right ear, which she attributes to sleeping on that side. She also experiences pain in her ear when opening her jaw. Despite trying home remedies such as ear drops and flushing, her symptoms persist. She reports no sore throat, headaches, fevers, or chills.    She has been using two inhalers, which cause her throat to feel scratchy and itchy. She reports no shortness of breath or cough. She was without her dry inhaler for almost 3 months, but after resuming its use, her breathing and coughing improved within a week and a half. She reports constant fatigue and poor sleep. She reports no congestion, ear drainage, facial swelling, postnasal drip, runny nose, sinus pain or pressure, or sore throat.    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 She reports no diarrhea unless she eats fast food. She reports no eye pain, but has dry eyes and plans to see her optometrist in 2 weeks. She is not currently using any eye drops. She reports no constipation or blood in urine, and does not wish to receive a tetanus vaccine. She has not eaten or drunk anything today, including coffee, in preparation for potential blood work. Her pulmonologist has requested blood work, but she has not yet completed it.    Review of Systems   Constitutional:  Negative for chills and fever.   HENT:  Positive for congestion and ear pain. Negative for ear discharge, facial swelling, postnasal drip, rhinorrhea, sinus pressure, sinus pain, sneezing, sore throat and trouble swallowing.    Eyes: Negative.   "  Respiratory:  Negative for cough, chest tightness and shortness of breath.    Cardiovascular: Negative.    Gastrointestinal:  Positive for diarrhea and nausea.   Endocrine: Negative.    Genitourinary: Negative.    Musculoskeletal: Negative.         Jaw pain   Skin: Negative.    Allergic/Immunologic: Negative.    Neurological: Negative.    Hematological: Negative.    Psychiatric/Behavioral: Negative.       Objective   /80 (BP Location: Left arm, Patient Position: Sitting, BP Cuff Size: Adult)   Pulse 83   Temp 36.2 °C (97.2 °F) (Temporal)   Ht 1.702 m (5' 7\")   Wt 96.6 kg (213 lb)   SpO2 93%   Physical Exam  General: Normal appearing. No distress.  HEENT: Right cerumen impaction.  Normocephalic. Eyes conjunctiva clear lids without ptosis, pupils equal and reactive to light accommodation, ears normal shape and contour, left tympanic membrane benign, nasal mucosa benign, oropharynx is without erythema, edema or exudates. Sinuses (frontal and maxillary) nontender to palpation.  Pulmonary: Clear to ausculation.  Normal effort. No rales, rhonchi, or wheezing.  Cardiovascular: Regular rate and rhythm without murmur. Carotid and radial pulses are intact and equal bilaterally.  Neurologic: Grossly nonfocal.  Skin: Warm and dry.  No obvious lesions.  Musculoskeletal: Normal gait. No extremity cyanosis, clubbing, or edema.  Psych: Normal mood and affect. Alert and oriented x3. Judgment and insight is normal.     Procedure: Cerumen Removal - Right  Risks and benefits of procedure discussed with patient.  Cerumen removed with lavage after softening agent was instilled  Patient tolerated the procedure well  Pt educated about proper care of ear canal. Q-tip cleaning discouraged, use of debrox and warm water lavage discussed.  Post procedure exam with clear canal and normal TM.   Assessment & Plan  1. Right ear pain  2. Right ear impacted cerumen  Cleared today.  - Ear Irrigation (MA Only); Future    3. Primary " osteoarthritis involving multiple joints  Chronic, ongoing.  She reports persistent back pain that has been difficult to control. She is advised to continue with her current pain management regimen and consider physical therapy if the pain does not improve.    4. Screening for colorectal cancer  Due for screening.  - Cologuard® colon cancer screening    5. Encounter for screening mammogram for breast cancer  Due for screening.  - MA-SCREENING MAMMO BILAT W/TOMOSYNTHESIS W/CAD; Future    6. Routine health maintenance  Due for Pap smear.  - Referral to Gynecology     I have placed the below orders and discussed them with an approved delegating provider. The MA is performing the below orders under the direction of Dr. Cadena.      Return for Preventative Annual, Follow up Labs.      Please note that this dictation was created using voice recognition software. I have made every reasonable attempt to correct obvious errors, but I expect that there are errors of grammar and possibly content that I did not discover before finalizing the note.

## 2024-11-23 LAB — IGE SERPL-ACNC: 18 KU/L

## 2024-12-25 DIAGNOSIS — R06.2 WHEEZING: ICD-10-CM

## 2024-12-26 NOTE — TELEPHONE ENCOUNTER
Have we ever prescribed this med? Yes.  If yes, what date? 7/17/24    Last OV: 10/4/24 GERALDINE Mora    Next OV: no pending appt    DX: Wheezing [R06.2]     Medications: albuterol 108 (90 Base) MCG/ACT Aero Soln inhalation aerosol

## 2024-12-31 RX ORDER — ALBUTEROL SULFATE 90 UG/1
1-2 INHALANT RESPIRATORY (INHALATION) 4 TIMES DAILY
Qty: 3 EACH | Refills: 3 | Status: SHIPPED | OUTPATIENT
Start: 2024-12-31

## 2025-01-14 LAB — NONINV COLON CA DNA+OCC BLD SCRN STL QL: NEGATIVE

## 2025-01-21 ENCOUNTER — HOSPITAL ENCOUNTER (OUTPATIENT)
Dept: RADIOLOGY | Facility: MEDICAL CENTER | Age: 60
End: 2025-01-21
Attending: NURSE PRACTITIONER
Payer: COMMERCIAL

## 2025-01-21 DIAGNOSIS — Z12.31 ENCOUNTER FOR SCREENING MAMMOGRAM FOR BREAST CANCER: ICD-10-CM

## 2025-01-21 PROCEDURE — 77067 SCR MAMMO BI INCL CAD: CPT

## 2025-01-23 ENCOUNTER — APPOINTMENT (OUTPATIENT)
Dept: MEDICAL GROUP | Facility: PHYSICIAN GROUP | Age: 60
End: 2025-01-23
Payer: COMMERCIAL

## 2025-01-23 VITALS
WEIGHT: 211.7 LBS | DIASTOLIC BLOOD PRESSURE: 60 MMHG | OXYGEN SATURATION: 95 % | SYSTOLIC BLOOD PRESSURE: 118 MMHG | HEART RATE: 98 BPM | TEMPERATURE: 98 F | HEIGHT: 66 IN | BODY MASS INDEX: 34.02 KG/M2

## 2025-01-23 DIAGNOSIS — E55.9 VITAMIN D DEFICIENCY: ICD-10-CM

## 2025-01-23 DIAGNOSIS — E78.2 MIXED HYPERLIPIDEMIA: ICD-10-CM

## 2025-01-23 DIAGNOSIS — H61.21 IMPACTED CERUMEN OF RIGHT EAR: ICD-10-CM

## 2025-01-23 DIAGNOSIS — E66.09 CLASS 1 OBESITY DUE TO EXCESS CALORIES WITH SERIOUS COMORBIDITY AND BODY MASS INDEX (BMI) OF 34.0 TO 34.9 IN ADULT: ICD-10-CM

## 2025-01-23 DIAGNOSIS — Z00.00 ENCOUNTER FOR WELL ADULT EXAM WITHOUT ABNORMAL FINDINGS: ICD-10-CM

## 2025-01-23 DIAGNOSIS — E66.811 CLASS 1 OBESITY DUE TO EXCESS CALORIES WITH SERIOUS COMORBIDITY AND BODY MASS INDEX (BMI) OF 34.0 TO 34.9 IN ADULT: ICD-10-CM

## 2025-01-23 DIAGNOSIS — R73.03 PREDIABETES: ICD-10-CM

## 2025-01-23 DIAGNOSIS — Z00.00 ROUTINE HEALTH MAINTENANCE: ICD-10-CM

## 2025-01-23 PROCEDURE — 3078F DIAST BP <80 MM HG: CPT | Performed by: NURSE PRACTITIONER

## 2025-01-23 PROCEDURE — 99396 PREV VISIT EST AGE 40-64: CPT | Performed by: NURSE PRACTITIONER

## 2025-01-23 PROCEDURE — 3074F SYST BP LT 130 MM HG: CPT | Performed by: NURSE PRACTITIONER

## 2025-01-23 ASSESSMENT — FIBROSIS 4 INDEX: FIB4 SCORE: 1.26

## 2025-01-23 ASSESSMENT — PATIENT HEALTH QUESTIONNAIRE - PHQ9: CLINICAL INTERPRETATION OF PHQ2 SCORE: 0

## 2025-01-23 NOTE — PROGRESS NOTES
Subjective:   CC:   Chief Complaint   Patient presents with    Annual Exam    Lab Results     Verbal consent was acquired by the patient to use [employer] ambient listening note generation during this visit Yes    HPI:   Елена Julian is a 59 y.o. female who presents for annual exam:    History of Present Illness  She reports experiencing fatigue, which she attributes to her work schedule. She also mentions poor sleep quality and generalized body aches, including back pain, foot pain, hand pain, and headaches persisting for a week. She has been experiencing cramps at night, which she believes may be due to inadequate hydration. Her fluid intake primarily consists of two large bottles of water brought from home, as she finds the water at her workplace unpalatable. She has reduced her coffee consumption from a pot to one cup in the morning and does not consume sodas or sweet teas. She has been supplementing her water with orange, lemon, lime slices, and raspberries to enhance its flavor. She maintains good dental health and is up to date with her dental check-ups. She needs to schedule an appointment with her ophthalmologist. She has declined STD screening and pneumonia and tetanus vaccines. She reports no chest pain, hematuria, or hematochezia. She has been using metamucil and reports increased bowel movements, which she finds beneficial. She has been taking magnesium glycinate and notes that her trigger finger has improved. However, she experiences stiffness in her hands and pain in three fingers when making a fist. She suspects carpal tunnel syndrome, as she has previously undergone carpal tunnel surgery on the other hand. She also reports numbness in her left hand and pain radiating down the middle of her hand. She has a history of cyst removal from her wrist and diagnoses of tendinitis and arthritis in both hands. She has been attempting to reduce her sugar intake and consumes avocados, particularly during  the summer. She uses avocado oil or extra virgin olive oil for cooking and avoids corn and vegetable oils. She enjoys swimming and plans to incorporate more stretching exercises into her routine. She has discontinued her gym membership due to her hand condition and has tried using an exercise bike. She reports no use of drugs, alcohol, or marijuana. She feels safe in her relationship and always wears a seatbelt while driving. She does not use sun protection outdoors. She has been experiencing cramps at night, which she believes may be due to inadequate hydration.     She does not consume fried foods and prefers fish over chicken, which she eats once a week. She had a cup of coffee, a handful of almonds, and a muffin today.    She underwent blood work in 2024 and had a mammogram two days ago. She has not had a menstrual period for approximately 5 years. She recently received a negative Cologuard test result.    SOCIAL HISTORY  She does not smoke, drink alcohol, or use drugs.    IMMUNIZATIONS  She declined the pneumonia and tetanus vaccines.     Anticipatory Guidance:  Cholesterol screenin2024   LDL controlled: 175  Diabetes screenin2024   A1c controlled: 6.1%  Diet: Recommend more lean meats, fruits, vegetables, whole grains.   Exercise: Encourage regular exercise.   Substance abuse: No   Safe in relationship: Yes  Seatbelts, bike/motorcycle helmet: Yes  Sun protection: Recommended  Dentist: Up to date   Eye doctor: Due for follow up    Cancer Screening:  Colorectal cancer screenin2025 Cologuard   Cervical cancer screenin/3/2018  Breast cancer screenin2025    Infectious Disease Screening/Immunizations:  STI screening: Declines  Chlamydia/Gonorrhea screening: Declines  Hep C screening: Complete  HIV screen: Complete  Practices safe sex: Yes    Immunizations:   Influenza: Declines   Tetanus: Declines   Hep B: Declines   Pneumonia: Declines  RSV: NA due at age 75  Shingrix:  Recommended    Received HPV series: Aged out    Preventative Care Screening:   Osteoporosis Screening: NA, due at age 66  Tobacco Screening: Never smoker  AAA Screening: NA    Patient's last menstrual period was 2020 (approximate).  She has not utilized hormone replacement therapy.  Reports hot flashes  No significant bloating/fluid retention, pelvic pain, or dyspareunia. No abnormal vaginal discharge.   No breast tenderness, mass, nipple discharge or changes in size or contour.    OB History    Para Term  AB Living   1 1 1 0 0 1   SAB IAB Ectopic Molar Multiple Live Births   0 0 0 0 0 1   Obstetric Comments   12lb baby boy, 26.3 inches,  177 staples     She  reports being sexually active and has had partner(s) who are male. She reports using the following method of birth control/protection: Condom.  She  has a past medical history of Arthritis, Cough, Delayed emergence from general anesthesia, Head ache, Left hand tendonitis (2020), Mass of hand, right (10/27/2022), Mixed hyperlipidemia (2018), Painful breathing, Right hand pain (2019), Shortness of breath, Sputum production, and Wheezing.  She  has a past surgical history that includes lumpectomy (Bilateral); primary c section (1988); lipoma excision (Left, ); and pr neuroplasty & or transpos median nrv carpal kaveh (Right, 2/10/2020).    Family History   Problem Relation Age of Onset    Heart Disease Mother     Lung Disease Mother     No Known Problems Father     Other Sister         Gum disease    Heart Disease Sister     Other Brother         MS    Heart Attack Brother     Lung Disease Brother     Cancer Brother         mouth cancer r/t chewing tobacco    Alcohol/Drug Brother         Yovanny ETOH    Lung Disease Maternal Grandmother     Kidney cancer Maternal Grandmother     Lung Cancer Maternal Grandmother     No Known Problems Paternal Grandmother     No Known Problems Sister      Social History     Tobacco Use     Smoking status: Never    Smokeless tobacco: Never    Tobacco comments:     Never smoked   Vaping Use    Vaping status: Never Used   Substance Use Topics    Alcohol use: Yes     Comment: Maybe drink twice a year    Drug use: No     Patient Active Problem List    Diagnosis Date Noted    Prediabetes 01/23/2025    Mild intermittent asthma with acute exacerbation 03/21/2024    Lesion of skin of face 10/27/2022    Dyspareunia in female 07/19/2022    Rheumatoid factor positive 11/14/2021    Positive anti-CCP test 11/14/2021    Hair loss 09/27/2021    Numbness of fingers 11/06/2019    Class 1 obesity due to excess calories with serious comorbidity and body mass index (BMI) of 34.0 to 34.9 in adult 01/03/2019    Vitamin D deficiency 08/03/2018    Mixed hyperlipidemia 08/03/2018    Postmenopausal 06/28/2018    Primary osteoarthritis involving multiple joints 06/28/2018     Current Outpatient Medications   Medication Sig Dispense Refill    albuterol 108 (90 Base) MCG/ACT Aero Soln inhalation aerosol Inhale 1-2 Puffs 4 times a day. 3 Each 3    fluticasone-salmeterol (ADVAIR DISKUS) 250-50 MCG/ACT AEROSOL POWDER, BREATH ACTIVATED Inhale 1 Puff 2 times a day. Rinse mouth after each use. 180 Each 3     No current facility-administered medications for this visit.     Allergies   Allergen Reactions    Codeine Vomiting     throat swelling    Fluorouracil Itching     Itching    Aspirin Itching     blisters    Ibuprofen Vomiting     And itching    Influenza Vaccines Hives    Latex Itching     And blisters only if worn- OK to be touched by others    Other Drug Shortness of Breath     Allergic to steroids - she was hospitalized in [place] after having steroid medications.     Tylenol Arthritis Pain Rash     Blisters in mouth    Vicodin [Hydrocodone-Acetaminophen] Hives     Sever N/V     Review of Systems   Constitutional: Negative for fever, chills and malaise/fatigue.   HENT: Negative for congestion.    Eyes: Negative for pain.  "  Respiratory: Negative for cough and shortness of breath.    Cardiovascular: Negative for chest pain and leg swelling.   Gastrointestinal: Negative for nausea, vomiting, abdominal pain and diarrhea.   Genitourinary: Negative for dysuria and hematuria.   Skin: Negative for rash.   Neurological: Negative for dizziness, focal weakness and headaches.   Endo/Heme/Allergies: Does not bruise/bleed easily.   Psychiatric/Behavioral: Negative for depression.  The patient is not nervous/anxious.      Objective:   /60 (BP Location: Left arm, Patient Position: Sitting, BP Cuff Size: Adult)   Pulse 98   Temp 36.7 °C (98 °F) (Temporal)   Ht 1.664 m (5' 5.5\")   Wt 96 kg (211 lb 11.2 oz)   LMP 01/23/2020 (Approximate)   SpO2 95%   BMI 34.69 kg/m²     Wt Readings from Last 4 Encounters:   01/23/25 96 kg (211 lb 11.2 oz)   11/21/24 96.6 kg (213 lb)   10/04/24 96.6 kg (213 lb)   03/21/24 99.3 kg (219 lb)     Physical Exam:  Constitutional: Well-developed and well-nourished. Not diaphoretic. No distress.   Skin: Skin is warm and dry. No rash noted.  Head: Atraumatic without lesions.  Eyes: Conjunctivae and extraocular motions are normal. Pupils are equal, round, and reactive to light. No scleral icterus.   Ears:  External ears unremarkable. Tympanic membranes clear and intact.  Nose: Nares patent. Septum midline. Turbinates without erythema nor edema. No discharge.   Mouth/Throat: Tongue normal. Oropharynx is clear and moist. Posterior pharynx without erythema or exudates.  Neck: Supple, trachea midline. Normal range of motion. No thyromegaly present. No lymphadenopathy--cervical or supraclavicular.  Cardiovascular: Regular rate and rhythm, S1 and S2 without murmur, rubs, or gallops.    Respiratory: Effort normal. Clear to auscultation throughout. No adventitious sounds.   Breast:  Breast exam deferred. Discussed monthly self exams and what to look for, including peau d'orange or nipple retraction, discharge, breasts " moving freely and equally without restriction, axillary/supraclavicular adenopathy, or palpable masses/nodules.  Abdomen: Soft, non tender, and without distention. Active bowel sounds in all four quadrants. No rebound, guarding.  Extremities: No cyanosis, clubbing, erythema, nor edema. Radial pulses intact and symmetric.   Musculoskeletal: All major joints AROM full in all directions without pain.  Neurological: Alert and oriented x 3. Grossly non-focal. Strength and sensation grossly intact.   Psychiatric:  Behavior, mood, and affect are appropriate.    Procedure: Cerumen Removal - right  Risks and benefits of procedure discussed with patient.  Cerumen removed with lavage after softening agent was instilled  Patient tolerated the procedure well  Pt educated about proper care of ear canal. Q-tip cleaning discouraged, use of debrox and warm water lavage discussed.  Post procedure exam with clear canal and normal TM.     Assessment and Plan:   1. Encounter for well adult exam without abnormal findings  Her recent mammogram results were normal. She completed a Cologuard test in 2025, which was negative. A referral to gynecology for a Pap smear has been made. A bone density test will be conducted when she reaches 66 years of age.    2. Mixed hyperlipidemia  Chronic, ongoing without medication. Her cholesterol levels are elevated, with an LDL of 175. Her risk of stroke or heart attack within the next 10 years is 4%. She is advised to follow a Mediterranean diet, reduce red meat and pork consumption, and increase intake of vegetables and fruits. Regular exercise, such as walking, is recommended. Due for updated annual labs prior to follow up in November 2025.  - Comp Metabolic Panel; Future  - Lipid Profile; Future  - TSH WITH REFLEX TO FT4; Future    3. Prediabetes  Chronic, ongoing without medication. Her fasting blood sugar is 106, and her A1c is 6.1%, indicating prediabetes. She is advised to reduce red meat  consumption to once a month and incorporate more fish into her diet. Regular exercise, such as walking, is recommended. Due for updated annual labs prior to follow up in November 2025.  - HEMOGLOBIN A1C; Future  - Comp Metabolic Panel; Future  - Lipid Profile; Future  - MICROALBUMIN CREAT RATIO URINE; Future    4. Class 1 obesity due to excess calories with serious comorbidity and body mass index (BMI) of 34.0 to 34.9 in adult  Chronic, ongoing.   Encourage diet high in fruits, vegetables, and fiber. And a diet low in salt, refined carbohydrates, cholesterol, saturated fat, and trans fatty acids.    Encourage a minimum of 30 minutes of moderate intensity aerobic exercise (eg, brisk walking) is recommended on five days each week. Or 30 minutes of vigorous-intensity aerobic exercise (eg, jogging) on three days each week.   Patient's body mass index is 34.69 kg/m². Exercise and nutrition counseling were performed at this visit.  Due for updated annual labs prior to follow up in November 2025.  - CBC WITH DIFFERENTIAL; Future  - Comp Metabolic Panel; Future  - Lipid Profile; Future  - TSH WITH REFLEX TO FT4; Future    5. Vitamin D deficiency  Chronic, ongoing without supplementation. Recommend OTC vitamin D 1000 units daily. Due for updated annual labs prior to follow up in November 2025.  - VITAMIN D,25 HYDROXY (DEFICIENCY); Future    6. Routine health maintenance  Due for updated annual labs prior to follow up in November 2025.  - HEMOGLOBIN A1C; Future  - CBC WITH DIFFERENTIAL; Future  - Comp Metabolic Panel; Future  - Lipid Profile; Future  - MICROALBUMIN CREAT RATIO URINE; Future  - TSH WITH REFLEX TO FT4; Future  - VITAMIN D,25 HYDROXY (DEFICIENCY); Future    7. Impacted cerumen of right ear  Cleared today.  - Ear Irrigation ([facility] Only); Future      Health maintenance: Up to date   Labs per orders  Immunizations: Declines   Patient counseled about skin care, diet, supplements, and exercise.  Discussed  breast self  exam, mammography screening, menopause, osteoporosis, adequate intake of calcium and vitamin D, diet and exercise, colorectal cancer screening.     Follow-up: Return in about 43 weeks (around 11/20/2025) for Follow up Labs.     I have placed the below orders and discussed them with an approved delegating provider. The MA is performing the below orders under the direction of Dr. Cadena.       Please note that this dictation was created using voice recognition software. I have worked with consultants from the vendor as well as technical experts from [employer] to optimize the interface. I have made every reasonable attempt to correct obvious errors, but I expect that there are errors of grammar and possibly content that I did not discover before finalizing the note.

## 2025-05-11 DIAGNOSIS — R06.2 WHEEZING: ICD-10-CM

## 2025-05-12 NOTE — TELEPHONE ENCOUNTER
Have we ever prescribed this med? Yes.  If yes, what date? 12/31/24    Last OV: 10/04/24 with Antoinette CHANDLER     Next OV: No Pending appt.     DX: wheezing    Medications:   Requested Prescriptions     Pending Prescriptions Disp Refills    albuterol 108 (90 Base) MCG/ACT Aero Soln inhalation aerosol 3 Each 3     Sig: Inhale 1-2 Puffs 4 times a day.

## 2025-05-14 RX ORDER — ALBUTEROL SULFATE 90 UG/1
1-2 INHALANT RESPIRATORY (INHALATION) 4 TIMES DAILY
Qty: 3 EACH | Refills: 3 | Status: SHIPPED | OUTPATIENT
Start: 2025-05-14

## (undated) DEVICE — CANISTER SUCTION RIGID RED 1500CC (40EA/CA)

## (undated) DEVICE — NEPTUNE 4 PORT MANIFOLD - (20/PK)

## (undated) DEVICE — TUBE CONNECTING SUCTION - CLEAR PLASTIC STERILE 72 IN (50EA/CA)

## (undated) DEVICE — BAG, SPONGE COUNT 50600

## (undated) DEVICE — HUMID-VENT HEAT AND MOISTURE EXCHANGE- (50/BX)

## (undated) DEVICE — GOWN WARMING STANDARD FLEX - (30/CA)

## (undated) DEVICE — WATER IRRIGATION STERILE 1000ML (12EA/CA)

## (undated) DEVICE — KIT ROOM DECONTAMINATION

## (undated) DEVICE — BANDAGE ELASTIC 4 IN X 5 YDS - LATEX FREE(10/BX 5BX/CA)

## (undated) DEVICE — SUCTION INSTRUMENT YANKAUER BULBOUS TIP W/O VENT (50EA/CA)

## (undated) DEVICE — DRAPE STRLE REG TOWEL 18X24 - (10/BX 4BX/CA)"

## (undated) DEVICE — DRESSING XEROFORM 1X8 - (50/BX 4BX/CA)

## (undated) DEVICE — MASK ANESTHESIA ADULT  - (100/CA)

## (undated) DEVICE — SPONGE GAUZE STER 4X4 8-PL - (2/PK 50PK/BX 12BX/CS)

## (undated) DEVICE — HEAD HOLDER JUNIOR/ADULT

## (undated) DEVICE — LACTATED RINGERS INJ 1000 ML - (14EA/CA 60CA/PF)

## (undated) DEVICE — SENSOR SPO2 NEO LNCS ADHESIVE (20/BX) SEE USER NOTES

## (undated) DEVICE — BLADE BEAVER 6400 MINI EYE ROUND TIP SHARP ON ONE SIDE (20/CA)

## (undated) DEVICE — PROTECTOR ULNA NERVE - (36PR/CA)

## (undated) DEVICE — PACK LOWER EXTREMITY - (2/CA)

## (undated) DEVICE — GLOVE, LITE (PAIR)

## (undated) DEVICE — ELECTRODE DUAL RETURN W/ CORD - (50/PK)

## (undated) DEVICE — KIT ANESTHESIA W/CIRCUIT & 3/LT BAG W/FILTER (20EA/CA)

## (undated) DEVICE — ELECTRODE 850 FOAM ADHESIVE - HYDROGEL RADIOTRNSPRNT (50/PK)

## (undated) DEVICE — BOVIE NEEDLE TIP INSULATD NON-SAFETY 2CM (50/PK)

## (undated) DEVICE — PADDING CAST 3 IN STERILE - 3 X 4 YDS (24EA/CA)

## (undated) DEVICE — BANDAGE ELASTIC 3 X 5 YDS - STERILE VELCRO (25/CA)LATEX

## (undated) DEVICE — GLOVE BIOGEL SZ 8 SURGICAL PF LTX - (50PR/BX 4BX/CA)

## (undated) DEVICE — BLADE SURGICAL #15 - (50/BX 3BX/CA)

## (undated) DEVICE — CHLORAPREP 26 ML APPLICATOR - ORANGE TINT(25/CA)

## (undated) DEVICE — CORDS BIPOLAR COAGULATION - 12FT STERILE DISP. (10EA/BX)

## (undated) DEVICE — TOURNIQUET CUFF 18 X 3 ONE PORT DISP - STERILE (10/BX)

## (undated) DEVICE — DRESSING ABDOMINAL PAD STERILE 8 X 10" (360EA/CA)"

## (undated) DEVICE — PADDING CAST 4 IN STERILE - 4 X 4 YDS (24/CA)

## (undated) DEVICE — PAD PREP 24 X 48 CUFFED - (100/CA)

## (undated) DEVICE — SUTURE GENERAL

## (undated) DEVICE — SODIUM CHL IRRIGATION 0.9% 1000ML (12EA/CA)